# Patient Record
Sex: FEMALE | Race: WHITE | NOT HISPANIC OR LATINO | URBAN - METROPOLITAN AREA
[De-identification: names, ages, dates, MRNs, and addresses within clinical notes are randomized per-mention and may not be internally consistent; named-entity substitution may affect disease eponyms.]

---

## 2020-02-14 ENCOUNTER — INPATIENT (INPATIENT)
Facility: HOSPITAL | Age: 80
LOS: 2 days | Discharge: HOME HEALTH | End: 2020-02-17
Attending: HOSPITALIST | Admitting: HOSPITALIST
Payer: MEDICARE

## 2020-02-14 VITALS
HEART RATE: 110 BPM | DIASTOLIC BLOOD PRESSURE: 85 MMHG | OXYGEN SATURATION: 98 % | TEMPERATURE: 100 F | RESPIRATION RATE: 24 BRPM | SYSTOLIC BLOOD PRESSURE: 160 MMHG

## 2020-02-14 DIAGNOSIS — Z95.1 PRESENCE OF AORTOCORONARY BYPASS GRAFT: Chronic | ICD-10-CM

## 2020-02-14 DIAGNOSIS — L03.90 CELLULITIS, UNSPECIFIED: ICD-10-CM

## 2020-02-14 DIAGNOSIS — Z96.649 PRESENCE OF UNSPECIFIED ARTIFICIAL HIP JOINT: Chronic | ICD-10-CM

## 2020-02-14 LAB
ALBUMIN SERPL ELPH-MCNC: 3.6 G/DL — SIGNIFICANT CHANGE UP (ref 3.3–5)
ALP SERPL-CCNC: 111 U/L — SIGNIFICANT CHANGE UP (ref 40–120)
ALT FLD-CCNC: 12 U/L — SIGNIFICANT CHANGE UP (ref 4–33)
ANION GAP SERPL CALC-SCNC: 12 MMO/L — SIGNIFICANT CHANGE UP (ref 7–14)
APTT BLD: 39.5 SEC — HIGH (ref 27.5–36.3)
AST SERPL-CCNC: 17 U/L — SIGNIFICANT CHANGE UP (ref 4–32)
BASE EXCESS BLDV CALC-SCNC: 1.3 MMOL/L — SIGNIFICANT CHANGE UP
BASOPHILS # BLD AUTO: 0.04 K/UL — SIGNIFICANT CHANGE UP (ref 0–0.2)
BASOPHILS NFR BLD AUTO: 0.3 % — SIGNIFICANT CHANGE UP (ref 0–2)
BILIRUB SERPL-MCNC: 1 MG/DL — SIGNIFICANT CHANGE UP (ref 0.2–1.2)
BLOOD GAS VENOUS - CREATININE: 0.45 MG/DL — LOW (ref 0.5–1.3)
BLOOD GAS VENOUS - FIO2: 21 — SIGNIFICANT CHANGE UP
BUN SERPL-MCNC: 23 MG/DL — SIGNIFICANT CHANGE UP (ref 7–23)
CALCIUM SERPL-MCNC: 10 MG/DL — SIGNIFICANT CHANGE UP (ref 8.4–10.5)
CHLORIDE BLDV-SCNC: 110 MMOL/L — HIGH (ref 96–108)
CHLORIDE SERPL-SCNC: 106 MMOL/L — SIGNIFICANT CHANGE UP (ref 98–107)
CO2 SERPL-SCNC: 25 MMOL/L — SIGNIFICANT CHANGE UP (ref 22–31)
CREAT SERPL-MCNC: 0.41 MG/DL — LOW (ref 0.5–1.3)
EOSINOPHIL # BLD AUTO: 0.03 K/UL — SIGNIFICANT CHANGE UP (ref 0–0.5)
EOSINOPHIL NFR BLD AUTO: 0.2 % — SIGNIFICANT CHANGE UP (ref 0–6)
GAS PNL BLDV: 143 MMOL/L — SIGNIFICANT CHANGE UP (ref 136–146)
GLUCOSE BLDV-MCNC: 129 MG/DL — HIGH (ref 70–99)
GLUCOSE SERPL-MCNC: 129 MG/DL — HIGH (ref 70–99)
HCO3 BLDV-SCNC: 23 MMOL/L — SIGNIFICANT CHANGE UP (ref 20–27)
HCT VFR BLD CALC: 33.6 % — LOW (ref 34.5–45)
HCT VFR BLDV CALC: 49.7 % — HIGH (ref 34.5–45)
HGB BLD-MCNC: 10.5 G/DL — LOW (ref 11.5–15.5)
HGB BLDV-MCNC: 16.2 G/DL — HIGH (ref 11.5–15.5)
IMM GRANULOCYTES NFR BLD AUTO: 1 % — SIGNIFICANT CHANGE UP (ref 0–1.5)
INR BLD: 1.07 — SIGNIFICANT CHANGE UP (ref 0.88–1.17)
LACTATE BLDV-MCNC: 2.4 MMOL/L — HIGH (ref 0.5–2)
LYMPHOCYTES # BLD AUTO: 18.5 % — SIGNIFICANT CHANGE UP (ref 13–44)
LYMPHOCYTES # BLD AUTO: 2.56 K/UL — SIGNIFICANT CHANGE UP (ref 1–3.3)
MAGNESIUM SERPL-MCNC: 2.1 MG/DL — SIGNIFICANT CHANGE UP (ref 1.6–2.6)
MCHC RBC-ENTMCNC: 30.3 PG — SIGNIFICANT CHANGE UP (ref 27–34)
MCHC RBC-ENTMCNC: 31.3 % — LOW (ref 32–36)
MCV RBC AUTO: 97.1 FL — SIGNIFICANT CHANGE UP (ref 80–100)
MONOCYTES # BLD AUTO: 1.5 K/UL — HIGH (ref 0–0.9)
MONOCYTES NFR BLD AUTO: 10.8 % — SIGNIFICANT CHANGE UP (ref 2–14)
NEUTROPHILS # BLD AUTO: 9.56 K/UL — HIGH (ref 1.8–7.4)
NEUTROPHILS NFR BLD AUTO: 69.2 % — SIGNIFICANT CHANGE UP (ref 43–77)
NRBC # FLD: 0 K/UL — SIGNIFICANT CHANGE UP (ref 0–0)
PCO2 BLDV: 48 MMHG — SIGNIFICANT CHANGE UP (ref 41–51)
PH BLDV: 7.36 PH — SIGNIFICANT CHANGE UP (ref 7.32–7.43)
PHOSPHATE SERPL-MCNC: 3 MG/DL — SIGNIFICANT CHANGE UP (ref 2.5–4.5)
PLATELET # BLD AUTO: 507 K/UL — HIGH (ref 150–400)
PMV BLD: 10 FL — SIGNIFICANT CHANGE UP (ref 7–13)
PO2 BLDV: 26 MMHG — LOW (ref 35–40)
POTASSIUM BLDV-SCNC: 4.3 MMOL/L — SIGNIFICANT CHANGE UP (ref 3.4–4.5)
POTASSIUM SERPL-MCNC: 4.5 MMOL/L — SIGNIFICANT CHANGE UP (ref 3.5–5.3)
POTASSIUM SERPL-SCNC: 4.5 MMOL/L — SIGNIFICANT CHANGE UP (ref 3.5–5.3)
PROT SERPL-MCNC: 7.5 G/DL — SIGNIFICANT CHANGE UP (ref 6–8.3)
PROTHROM AB SERPL-ACNC: 12.2 SEC — SIGNIFICANT CHANGE UP (ref 9.8–13.1)
RBC # BLD: 3.46 M/UL — LOW (ref 3.8–5.2)
RBC # FLD: 14.8 % — HIGH (ref 10.3–14.5)
SAO2 % BLDV: 28.6 % — LOW (ref 60–85)
SODIUM SERPL-SCNC: 143 MMOL/L — SIGNIFICANT CHANGE UP (ref 135–145)
WBC # BLD: 13.83 K/UL — HIGH (ref 3.8–10.5)
WBC # FLD AUTO: 13.83 K/UL — HIGH (ref 3.8–10.5)

## 2020-02-14 PROCEDURE — 93971 EXTREMITY STUDY: CPT | Mod: 26,LT

## 2020-02-14 PROCEDURE — 71045 X-RAY EXAM CHEST 1 VIEW: CPT | Mod: 26

## 2020-02-14 RX ORDER — AMPICILLIN SODIUM AND SULBACTAM SODIUM 250; 125 MG/ML; MG/ML
1.5 INJECTION, POWDER, FOR SUSPENSION INTRAMUSCULAR; INTRAVENOUS ONCE
Refills: 0 | Status: COMPLETED | OUTPATIENT
Start: 2020-02-14 | End: 2020-02-14

## 2020-02-14 RX ORDER — SODIUM CHLORIDE 9 MG/ML
500 INJECTION INTRAMUSCULAR; INTRAVENOUS; SUBCUTANEOUS ONCE
Refills: 0 | Status: COMPLETED | OUTPATIENT
Start: 2020-02-14 | End: 2020-02-14

## 2020-02-14 RX ADMIN — SODIUM CHLORIDE 500 MILLILITER(S): 9 INJECTION INTRAMUSCULAR; INTRAVENOUS; SUBCUTANEOUS at 22:03

## 2020-02-14 RX ADMIN — AMPICILLIN SODIUM AND SULBACTAM SODIUM 200 GRAM(S): 250; 125 INJECTION, POWDER, FOR SUSPENSION INTRAMUSCULAR; INTRAVENOUS at 22:44

## 2020-02-14 NOTE — ED PROVIDER NOTE - OBJECTIVE STATEMENT
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 Was seen by [facility] started on ciprofloxacin for suspected cellulitis, however has conitnued to have worsening of swelling subsequently brought here for evaluation. Is not on AC despite hx of multiple clots due to GI bleeds that occurred with therapy. Son is worried she has a blood clot.

## 2020-02-14 NOTE — ED PROVIDER NOTE - CLINICAL SUMMARY MEDICAL DECISION MAKING FREE TEXT BOX
80F with complex hx (see HPI) presenting with new RLE swelling and redness that is already on cipro and getting worse, high suspicion for blood clot but patient with GI bleeds when on AC in the past, will check labs, doppler, cxr due to hx of cough x3 days per aide, if clot start on heparin as it is easy to turn off and reverse if a significant GI bleed develops, patient TBA given high likelihood of failure with outpatient management.

## 2020-02-14 NOTE — ED PROVIDER NOTE - PHYSICAL EXAMINATION
Gen: chronically ill appearing, all extremities contracted, non-verbal  Eyes: PERRL, EOMI   HENT: Normocephalic, atraumatic. External ears normal, no rhinorrhea, dry mucous membranes.   CV: tachycardia, regular rhythm  Resp: CTAB though difficult to assess given kyphosis  Abd: soft, non tender, non rigid, no guarding or rebound tenderness  Back: No CVAT bilaterally, no midline ttp  Skin: dry, wwp   Neuro: non-verbal, all extremities with contractures, RUE tremor  Psych: baseline per family

## 2020-02-14 NOTE — ED ADULT TRIAGE NOTE - CHIEF COMPLAINT QUOTE
Arrives via EMS pt non-verbal.  As per son right leg swelling, saw PMD via home visit, for same and started on Cipro.  Son noticed rash today.  Pt lives with son, appears contracted.

## 2020-02-14 NOTE — ED PROVIDER NOTE - PMH
Cancer of breast, female, left    DVT (deep venous thrombosis)    GIB (gastrointestinal bleeding)    Glaucoma    S/P IVC filter    TIA (transient ischemic attack)

## 2020-02-14 NOTE — ED ADULT NURSE NOTE - OBJECTIVE STATEMENT
Pt presents to ED with right lower extremity redness/swelling that began on Wednesday. Pt nonverbal as per family members. Pt contracted and left arm does not work. As per son, pt had a doctor home visit where he started her on cipro but the swelling and redness became worse. redness and swelling noted to right lower extremity and warm to touch. Nonverbal indicators of pain absent at this time. pt NSR/sinus tachycardia on the monitor. STage 3 pressure ulcer noted to the coccyx/sacral area. Unable to obtain IV access at this time. MD Watson made aware. Will continue to monitor.

## 2020-02-14 NOTE — ED PROVIDER NOTE - ATTENDING CONTRIBUTION TO CARE
Héctor: 79yo female with an extensive medical history including anoxic brain injury and subsequently bed bound and nonambulatory with h/o multiple DVT in the past BIBEMS for RLE swelling and erythema x 3 days. Pt starte don outpatient cipro but her swelling and redness has continued to worsen prompting her visit to the ED today. Pt unable to provide any history an dis non verbal as per family. No apparent SOB or tachypnea. Exam: GENERAL: chronically ill appearing, NAD, HEENT: MMM, CARDIO: +S1/S2, no murmurs, rubs or gallops, LUNGS: CTA B/L, no wheezing, rales or rhonchi, ABD: soft, nontender, BSx4 quadrants, no guarding or rigidity. EXT: contracted b/l LE and LUE. + RLE erythema and edema with palpable cords in calf and TTP. NEURO: awake and alert but nonverbal and doesn't follow commands. SKIN: no rashes or lesions, well perfused A/P- 81 yo female with likley RLE DVT. Pt has IVC filter and a poor candidate for anticoagulation given previous histories of GI bleeds. will obtain US.

## 2020-02-14 NOTE — ED PROVIDER NOTE - CARE PLAN
Principal Discharge DX:	Cellulitis of right lower extremity  Secondary Diagnosis:	Failure of outpatient treatment  Secondary Diagnosis:	Hematoma of lower extremity, initial encounter

## 2020-02-14 NOTE — ED ADULT NURSE NOTE - INTERVENTIONS DEFINITIONS
Call bell, personal items and telephone within reach/Monitor for mental status changes and reorient to person, place, and time/Room bathroom lighting operational/Stretcher in lowest position, wheels locked, appropriate side rails in place/Monitor gait and stability/Reinforce activity limits and safety measures with patient and family/[place] to call system/Instruct patient to call for assistance/Non-slip footwear when patient is off stretcher/Physically safe environment: no spills, clutter or unnecessary equipment/Review medications for side effects contributing to fall risk

## 2020-02-15 DIAGNOSIS — M71.20 SYNOVIAL CYST OF POPLITEAL SPACE [BAKER], UNSPECIFIED KNEE: ICD-10-CM

## 2020-02-15 DIAGNOSIS — S80.10XA CONTUSION OF UNSPECIFIED LOWER LEG, INITIAL ENCOUNTER: ICD-10-CM

## 2020-02-15 DIAGNOSIS — I25.10 ATHEROSCLEROTIC HEART DISEASE OF NATIVE CORONARY ARTERY WITHOUT ANGINA PECTORIS: ICD-10-CM

## 2020-02-15 DIAGNOSIS — Z29.9 ENCOUNTER FOR PROPHYLACTIC MEASURES, UNSPECIFIED: ICD-10-CM

## 2020-02-15 DIAGNOSIS — Z02.9 ENCOUNTER FOR ADMINISTRATIVE EXAMINATIONS, UNSPECIFIED: ICD-10-CM

## 2020-02-15 DIAGNOSIS — L03.115 CELLULITIS OF RIGHT LOWER LIMB: ICD-10-CM

## 2020-02-15 DIAGNOSIS — Z98.890 OTHER SPECIFIED POSTPROCEDURAL STATES: Chronic | ICD-10-CM

## 2020-02-15 DIAGNOSIS — G93.1 ANOXIC BRAIN DAMAGE, NOT ELSEWHERE CLASSIFIED: ICD-10-CM

## 2020-02-15 DIAGNOSIS — I82.409 ACUTE EMBOLISM AND THROMBOSIS OF UNSPECIFIED DEEP VEINS OF UNSPECIFIED LOWER EXTREMITY: ICD-10-CM

## 2020-02-15 LAB
ALBUMIN SERPL ELPH-MCNC: 3.1 G/DL — LOW (ref 3.3–5)
ALP SERPL-CCNC: 105 U/L — SIGNIFICANT CHANGE UP (ref 40–120)
ALT FLD-CCNC: 8 U/L — SIGNIFICANT CHANGE UP (ref 4–33)
ANION GAP SERPL CALC-SCNC: 13 MMO/L — SIGNIFICANT CHANGE UP (ref 7–14)
APTT BLD: 27.3 SEC — LOW (ref 27.5–36.3)
AST SERPL-CCNC: 13 U/L — SIGNIFICANT CHANGE UP (ref 4–32)
BASOPHILS # BLD AUTO: 0.02 K/UL — SIGNIFICANT CHANGE UP (ref 0–0.2)
BASOPHILS NFR BLD AUTO: 0.2 % — SIGNIFICANT CHANGE UP (ref 0–2)
BILIRUB SERPL-MCNC: 0.9 MG/DL — SIGNIFICANT CHANGE UP (ref 0.2–1.2)
BUN SERPL-MCNC: 20 MG/DL — SIGNIFICANT CHANGE UP (ref 7–23)
CALCIUM SERPL-MCNC: 9.3 MG/DL — SIGNIFICANT CHANGE UP (ref 8.4–10.5)
CHLORIDE SERPL-SCNC: 110 MMOL/L — HIGH (ref 98–107)
CO2 SERPL-SCNC: 23 MMOL/L — SIGNIFICANT CHANGE UP (ref 22–31)
CREAT SERPL-MCNC: 0.37 MG/DL — LOW (ref 0.5–1.3)
EOSINOPHIL # BLD AUTO: 0.05 K/UL — SIGNIFICANT CHANGE UP (ref 0–0.5)
EOSINOPHIL NFR BLD AUTO: 0.5 % — SIGNIFICANT CHANGE UP (ref 0–6)
GLUCOSE SERPL-MCNC: 122 MG/DL — HIGH (ref 70–99)
HCT VFR BLD CALC: 28.6 % — LOW (ref 34.5–45)
HGB BLD-MCNC: 9.1 G/DL — LOW (ref 11.5–15.5)
IMM GRANULOCYTES NFR BLD AUTO: 0.5 % — SIGNIFICANT CHANGE UP (ref 0–1.5)
LYMPHOCYTES # BLD AUTO: 2.43 K/UL — SIGNIFICANT CHANGE UP (ref 1–3.3)
LYMPHOCYTES # BLD AUTO: 22.4 % — SIGNIFICANT CHANGE UP (ref 13–44)
MAGNESIUM SERPL-MCNC: 2.1 MG/DL — SIGNIFICANT CHANGE UP (ref 1.6–2.6)
MCHC RBC-ENTMCNC: 30.8 PG — SIGNIFICANT CHANGE UP (ref 27–34)
MCHC RBC-ENTMCNC: 31.8 % — LOW (ref 32–36)
MCV RBC AUTO: 96.9 FL — SIGNIFICANT CHANGE UP (ref 80–100)
MONOCYTES # BLD AUTO: 1.02 K/UL — HIGH (ref 0–0.9)
MONOCYTES NFR BLD AUTO: 9.4 % — SIGNIFICANT CHANGE UP (ref 2–14)
NEUTROPHILS # BLD AUTO: 7.3 K/UL — SIGNIFICANT CHANGE UP (ref 1.8–7.4)
NEUTROPHILS NFR BLD AUTO: 67 % — SIGNIFICANT CHANGE UP (ref 43–77)
NRBC # FLD: 0 K/UL — SIGNIFICANT CHANGE UP (ref 0–0)
PHOSPHATE SERPL-MCNC: 2.8 MG/DL — SIGNIFICANT CHANGE UP (ref 2.5–4.5)
PLATELET # BLD AUTO: 437 K/UL — HIGH (ref 150–400)
PMV BLD: 9.5 FL — SIGNIFICANT CHANGE UP (ref 7–13)
POTASSIUM SERPL-MCNC: 4 MMOL/L — SIGNIFICANT CHANGE UP (ref 3.5–5.3)
POTASSIUM SERPL-SCNC: 4 MMOL/L — SIGNIFICANT CHANGE UP (ref 3.5–5.3)
PROT SERPL-MCNC: 6.8 G/DL — SIGNIFICANT CHANGE UP (ref 6–8.3)
RBC # BLD: 2.95 M/UL — LOW (ref 3.8–5.2)
RBC # FLD: 15 % — HIGH (ref 10.3–14.5)
SODIUM SERPL-SCNC: 146 MMOL/L — HIGH (ref 135–145)
WBC # BLD: 10.87 K/UL — HIGH (ref 3.8–10.5)
WBC # FLD AUTO: 10.87 K/UL — HIGH (ref 3.8–10.5)

## 2020-02-15 PROCEDURE — 73600 X-RAY EXAM OF ANKLE: CPT | Mod: 26,RT

## 2020-02-15 PROCEDURE — 99223 1ST HOSP IP/OBS HIGH 75: CPT | Mod: GC

## 2020-02-15 PROCEDURE — 73590 X-RAY EXAM OF LOWER LEG: CPT | Mod: 26,RT

## 2020-02-15 PROCEDURE — 12345: CPT | Mod: NC,GC

## 2020-02-15 RX ORDER — SUCRALFATE 1 G
5 TABLET ORAL
Qty: 0 | Refills: 0 | DISCHARGE

## 2020-02-15 RX ORDER — METOPROLOL TARTRATE 50 MG
50 TABLET ORAL
Refills: 0 | Status: DISCONTINUED | OUTPATIENT
Start: 2020-02-15 | End: 2020-02-17

## 2020-02-15 RX ORDER — CEFAZOLIN SODIUM 1 G
1000 VIAL (EA) INJECTION EVERY 12 HOURS
Refills: 0 | Status: DISCONTINUED | OUTPATIENT
Start: 2020-02-15 | End: 2020-02-15

## 2020-02-15 RX ORDER — PANTOPRAZOLE SODIUM 20 MG/1
40 TABLET, DELAYED RELEASE ORAL
Refills: 0 | Status: DISCONTINUED | OUTPATIENT
Start: 2020-02-15 | End: 2020-02-15

## 2020-02-15 RX ORDER — PANTOPRAZOLE SODIUM 20 MG/1
1 TABLET, DELAYED RELEASE ORAL
Qty: 0 | Refills: 0 | DISCHARGE

## 2020-02-15 RX ORDER — CEFAZOLIN SODIUM 1 G
1000 VIAL (EA) INJECTION EVERY 8 HOURS
Refills: 0 | Status: DISCONTINUED | OUTPATIENT
Start: 2020-02-15 | End: 2020-02-15

## 2020-02-15 RX ORDER — ATORVASTATIN CALCIUM 80 MG/1
80 TABLET, FILM COATED ORAL AT BEDTIME
Refills: 0 | Status: DISCONTINUED | OUTPATIENT
Start: 2020-02-15 | End: 2020-02-17

## 2020-02-15 RX ORDER — EZETIMIBE 10 MG/1
1 TABLET ORAL
Qty: 0 | Refills: 0 | DISCHARGE

## 2020-02-15 RX ORDER — SUCRALFATE 1 G
0.5 TABLET ORAL
Refills: 0 | Status: DISCONTINUED | OUTPATIENT
Start: 2020-02-15 | End: 2020-02-17

## 2020-02-15 RX ORDER — FERROUS SULFATE 325(65) MG
325 TABLET ORAL DAILY
Refills: 0 | Status: DISCONTINUED | OUTPATIENT
Start: 2020-02-15 | End: 2020-02-17

## 2020-02-15 RX ORDER — PANTOPRAZOLE SODIUM 20 MG/1
40 TABLET, DELAYED RELEASE ORAL DAILY
Refills: 0 | Status: DISCONTINUED | OUTPATIENT
Start: 2020-02-15 | End: 2020-02-17

## 2020-02-15 RX ORDER — LEVETIRACETAM 250 MG/1
500 TABLET, FILM COATED ORAL
Refills: 0 | Status: DISCONTINUED | OUTPATIENT
Start: 2020-02-15 | End: 2020-02-17

## 2020-02-15 RX ORDER — SODIUM CHLORIDE 9 MG/ML
1000 INJECTION, SOLUTION INTRAVENOUS
Refills: 0 | Status: DISCONTINUED | OUTPATIENT
Start: 2020-02-15 | End: 2020-02-17

## 2020-02-15 RX ORDER — LEVETIRACETAM 250 MG/1
1 TABLET, FILM COATED ORAL
Qty: 0 | Refills: 0 | DISCHARGE

## 2020-02-15 RX ORDER — CEFAZOLIN SODIUM 1 G
1000 VIAL (EA) INJECTION EVERY 8 HOURS
Refills: 0 | Status: DISCONTINUED | OUTPATIENT
Start: 2020-02-15 | End: 2020-02-17

## 2020-02-15 RX ADMIN — SODIUM CHLORIDE 75 MILLILITER(S): 9 INJECTION, SOLUTION INTRAVENOUS at 09:09

## 2020-02-15 RX ADMIN — Medication 325 MILLIGRAM(S): at 12:20

## 2020-02-15 RX ADMIN — Medication 100 MILLIGRAM(S): at 22:08

## 2020-02-15 RX ADMIN — Medication 0.5 GRAM(S): at 05:49

## 2020-02-15 RX ADMIN — Medication 50 MILLIGRAM(S): at 05:49

## 2020-02-15 RX ADMIN — Medication 50 MILLIGRAM(S): at 17:26

## 2020-02-15 RX ADMIN — Medication 100 MILLIGRAM(S): at 05:49

## 2020-02-15 RX ADMIN — Medication 100 MILLIGRAM(S): at 13:08

## 2020-02-15 RX ADMIN — LEVETIRACETAM 500 MILLIGRAM(S): 250 TABLET, FILM COATED ORAL at 17:26

## 2020-02-15 RX ADMIN — ATORVASTATIN CALCIUM 80 MILLIGRAM(S): 80 TABLET, FILM COATED ORAL at 22:08

## 2020-02-15 RX ADMIN — Medication 0.5 GRAM(S): at 17:26

## 2020-02-15 RX ADMIN — PANTOPRAZOLE SODIUM 40 MILLIGRAM(S): 20 TABLET, DELAYED RELEASE ORAL at 12:19

## 2020-02-15 RX ADMIN — Medication 10 MILLIGRAM(S): at 12:20

## 2020-02-15 RX ADMIN — LEVETIRACETAM 500 MILLIGRAM(S): 250 TABLET, FILM COATED ORAL at 05:49

## 2020-02-15 NOTE — PROGRESS NOTE ADULT - PROBLEM SELECTOR PLAN 7
DVT: SCD due to bleeding risks  Diet: pureed diet  GI ppx: carafate and protonix  GOC: DNR/DNI/Limited medical interventions/no feeding tube

## 2020-02-15 NOTE — H&P ADULT - PROBLEM SELECTOR PLAN 2
5.4 x 2.5 x 3.1 cm sized heterogeneous echogenic lesion in the proximal calf, likely hematoma.  -continue to monitor  -monitor HgB

## 2020-02-15 NOTE — PROGRESS NOTE ADULT - PROBLEM SELECTOR PLAN 4
1.5 cm Baker's cyst in the popliteal fossa.  -monitor for increased knee swelling or signs of pain   -pain meds as indicated

## 2020-02-15 NOTE — PROGRESS NOTE ADULT - PROBLEM SELECTOR PLAN 1
Pt with RLE swelling, redness and warmth. Febrile to 100.9   -Day 1/5 cefazolin 1 g q8h IV. May switch to orals after further clinical improvement.  -duplex without evidence of DVT   -low suspicion for septic joint however will get xray of the right foot and ankle to further assess for joint effusion   -f/u bldcx  -s/p unasyn in ED; start cefazolin Pt with RLE swelling, redness and warmth. Febrile to 100.9   -Day 1/5 cefazolin 1 g q8h IV. May switch to orals after further clinical improvement.  -duplex without evidence of DVT   -low suspicion for septic joint however will get xray of the right foot and ankle to further assess for joint effusion   -f/u bldcx

## 2020-02-15 NOTE — PROGRESS NOTE ADULT - SUBJECTIVE AND OBJECTIVE BOX
INCOMPLETE NOTE PROGRESS NOTE:     Patient is a 80y old  Female who presents with a chief complaint of Cellulitis (15 Feb 2020 09:53)      SUBJECTIVE / OVERNIGHT EVENTS:  No acute events overnight  History could not be obtained due to patient's mental status  Per family, hematoma, leg swelling appears unchanged in size. They first noticed it yesterday.    ADDITIONAL REVIEW OF SYSTEMS:  History could not be obtained due to patient's mental status    MEDICATIONS  (STANDING):  atorvastatin 80 milliGRAM(s) Oral at bedtime  ceFAZolin   IVPB 1000 milliGRAM(s) IV Intermittent every 8 hours  ferrous    sulfate 325 milliGRAM(s) Oral daily  levETIRAcetam 500 milliGRAM(s) Oral two times a day  metoprolol tartrate 50 milliGRAM(s) Oral two times a day  pantoprazole   Suspension 40 milliGRAM(s) Oral daily  PARoxetine 10 milliGRAM(s) Oral daily  sodium chloride 0.45%. 1000 milliLiter(s) (75 mL/Hr) IV Continuous <Continuous>  sucralfate suspension 0.5 Gram(s) Oral two times a day    MEDICATIONS  (PRN):      CAPILLARY BLOOD GLUCOSE        I&O's Summary    14 Feb 2020 07:01  -  15 Feb 2020 07:00  --------------------------------------------------------  IN: 50 mL / OUT: 0 mL / NET: 50 mL        PHYSICAL EXAM:  Vital Signs Last 24 Hrs  T(C): 36.9 (15 Feb 2020 05:46), Max: 38.3 (14 Feb 2020 23:49)  T(F): 98.4 (15 Feb 2020 05:46), Max: 100.9 (14 Feb 2020 23:49)  HR: 98 (15 Feb 2020 05:46) (98 - 114)  BP: 157/73 (15 Feb 2020 05:46) (153/61 - 161/35)  BP(mean): --  RR: 18 (15 Feb 2020 05:46) (18 - 24)  SpO2: 99% (15 Feb 2020 05:46) (90% - 100%)    CONSTITUTIONAL: Lying in bed. Contracted. Does not make eye contact. Grunting.  CARDIOVASCULAR: Regular rate and rhythm. Normal S1 and S2. No murmurs.  RESPIRATORY: Normal respiratory effort, Lungs CTAB  EXTREMITIES: Hypertonic extremities. There is an area of mild erythema starting 1 cm above the R malleoli and extending 5-6 cm from there primarily on the anterior medial aspect that has now been demarcated with a marker. There is a firm, mass in flexor compartment of the right lower extremity below the knee with overlying erythema.  ABDOMEN: Nontender to palpation, no rebound/guarding; No hepatosplenomegaly  PSYCH/NEURO: Nonverbal, grunting. Does not track. Hypertonic extremities    LABS:                        9.1    10.87 )-----------( 437      ( 15 Feb 2020 05:02 )             28.6     02-15    146<H>  |  110<H>  |  20  ----------------------------<  122<H>  4.0   |  23  |  0.37<L>    Ca    9.3      15 Feb 2020 05:02  Phos  2.8     02-15  Mg     2.1     02-15    TPro  6.8  /  Alb  3.1<L>  /  TBili  0.9  /  DBili  x   /  AST  13  /  ALT  8   /  AlkPhos  105  02-15    PT/INR - ( 14 Feb 2020 20:40 )   PT: 12.2 SEC;   INR: 1.07          PTT - ( 15 Feb 2020 05:02 )  PTT:27.3 SEC            RADIOLOGY & ADDITIONAL TESTS:  Results Reviewed: Leukocytosis downtrending, mild hypernatremia.  Imaging Personally Reviewed:  Electrocardiogram Personally Reviewed:    COORDINATION OF CARE:  Care Discussed with Consultants/Other Providers [Y/N]:  Prior or Outpatient Records Reviewed [Y/N]:

## 2020-02-15 NOTE — H&P ADULT - PROBLEM SELECTOR PLAN 3
-pt with hx of anoxic brain injury in 1997 with intermittent agitation  -c/w paxil -pt with hx of anoxic brain injury in 1997 with intermittent agitation  -c/w paxil  -c/w keppra for seizure ppx

## 2020-02-15 NOTE — H&P ADULT - ASSESSMENT
75 year old female with CAD with MI s/p CABG x5 in 1997 with bypass rupture a few hours after surgery resulting in severe anoxic brain injury with resulting functional quadriplegia, also with, vascular dementia, L breast cancer (dx 2002 s/p quadrantectomy), bilateral DVTs in 2004 (transiently on coumadin, but had to be discontinued to episodes of UGIB, now with IVC filter), GERD, PUB s/p multiple pRBC transfusions, rectal prolapse (with intermittent blood in stools), TIA in 2013, R ulna fracture in 2009, R femur fracture in 2012, R tibia/fibula fracture in 2014. Px with RLE swelling c/w cellulitis. No DVT on duplex. 75 year old female with CAD with MI s/p CABG x5 in 1997 with bypass rupture a few hours after surgery resulting in severe anoxic brain injury with resulting functional quadriplegia, also with, vascular dementia, L breast cancer (dx 2002 s/p quadrantectomy), bilateral DVTs in 2004 (transiently on coumadin, but had to be discontinued to episodes of UGIB, now with IVC filter), GERD, PUD s/p multiple pRBC transfusions, rectal prolapse (with intermittent blood in stools), TIA in 2013, R ulna fracture in 2009, R femur fracture in 2012, R tibia/fibula fracture in 2014. Px with RLE swelling c/w cellulitis. No DVT on duplex.       No evidence of right lower extremity deep venous thrombosis.    A 1.5 cm Baker's cyst in the popliteal fossa. 75 year old female with CAD with MI s/p CABG x5 in 1997 with bypass rupture a few hours after surgery resulting in severe anoxic brain injury with resulting functional quadriplegia, also with, vascular dementia, L breast cancer (dx 2002 s/p quadrantectomy), bilateral DVTs in 2004 (transiently on coumadin, but had to be discontinued to episodes of UGIB, now with IVC filter), GERD, PUD s/p multiple pRBC transfusions, rectal prolapse (with intermittent blood in stools), TIA in 2013, R ulna fracture in 2009, R femur fracture in 2012, R tibia/fibula fracture in 2014. Px with RLE swelling c/w cellulitis. No DVT on duplex.

## 2020-02-15 NOTE — H&P ADULT - NSICDXPASTMEDICALHX_GEN_ALL_CORE_FT
PAST MEDICAL HISTORY:  Cancer of breast, female, left     DVT (deep venous thrombosis)     GIB (gastrointestinal bleeding)     Glaucoma     S/P IVC filter     TIA (transient ischemic attack)

## 2020-02-15 NOTE — H&P ADULT - NSHPPHYSICALEXAM_GEN_ALL_CORE
Vital Signs Last 24 Hrs  T(C): 38.3 (14 Feb 2020 23:49), Max: 38.3 (14 Feb 2020 23:49)  T(F): 100.9 (14 Feb 2020 23:49), Max: 100.9 (14 Feb 2020 23:49)  HR: 114 (14 Feb 2020 23:49) (110 - 114)  BP: 161/35 (14 Feb 2020 23:49) (160/85 - 161/35)  BP(mean): --  RR: 18 (14 Feb 2020 23:49) (18 - 24)  SpO2: 100% (14 Feb 2020 23:49) (98% - 100%)    GENERAL: NAD, chronically ill appearing, all extremities contracted  HEAD: Atraumatic, Normocephalic  EYES: EOMI, PERRLA, conjunctiva and sclera clear  NECK: Supple, No JVD  CHEST/LUNG: Clear to auscultation bilaterally; No wheezes/rales/rhonchi  HEART: Regular rate and rhythm; No murmurs, rubs, or gallops  ABDOMEN: Soft, Nontender, Nondistended; Bowel sounds present  EXTREMITIES:  2+ dP pulses b/l,  + RLE erythema and edema   PSYCH: reactive affect  NEUROLOGY: non-verbal at time of exam, spontaneosly moves all extremities   SKIN: No rashes or lesions Vital Signs Last 24 Hrs  T(C): 38.3 (14 Feb 2020 23:49), Max: 38.3 (14 Feb 2020 23:49)  T(F): 100.9 (14 Feb 2020 23:49), Max: 100.9 (14 Feb 2020 23:49)  HR: 114 (14 Feb 2020 23:49) (110 - 114)  BP: 161/35 (14 Feb 2020 23:49) (160/85 - 161/35)  BP(mean): --  RR: 18 (14 Feb 2020 23:49) (18 - 24)  SpO2: 100% (14 Feb 2020 23:49) (98% - 100%)    GENERAL: NAD, chronically ill appearing, all extremities contracted  HEAD: Atraumatic, Normocephalic  EYES: EOMI, PERRLA, conjunctiva and sclera clear  NECK: Supple, No JVD  CHEST/LUNG: Clear to auscultation bilaterally; No wheezes/rales/rhonchi  HEART: Regular rate and rhythm; No murmurs, rubs, or gallops  ABDOMEN: Soft, Nontender, Nondistended; Bowel sounds present  EXTREMITIES:  2+ dP pulses b/l,  + RLE erythema and edema , constantly flopping RUE (normal for patient as per son)   PSYCH: reactive affect  NEUROLOGY: non-verbal at time of exam, spontaneously moves all extremities   SKIN: No rashes or lesions Vital Signs Last 24 Hrs  T(C): 38.3 (14 Feb 2020 23:49), Max: 38.3 (14 Feb 2020 23:49)  T(F): 100.9 (14 Feb 2020 23:49), Max: 100.9 (14 Feb 2020 23:49)  HR: 114 (14 Feb 2020 23:49) (110 - 114)  BP: 161/35 (14 Feb 2020 23:49) (160/85 - 161/35)  BP(mean): --  RR: 18 (14 Feb 2020 23:49) (18 - 24)  SpO2: 100% (14 Feb 2020 23:49) (98% - 100%)    GENERAL: NAD, chronically ill appearing, all extremities contracted  HEAD: Atraumatic, Normocephalic  EYES: EOMI, PERRLA, conjunctiva and sclera clear  NECK: Supple, No JVD  CHEST/LUNG: Clear to auscultation bilaterally; No wheezes/rales/rhonchi  HEART: Regular rate and rhythm; No murmurs, rubs, or gallops  ABDOMEN: Soft, Nontender, Nondistended; Bowel sounds present  EXTREMITIES:  2+ dP pulses b/l,  + RLE  circumferential erythema and swelling (from above ankle to below knee) , constantly flopping RUE (normal for patient as per son)   PSYCH: reactive affect  NEUROLOGY: non-verbal at time of exam, spontaneously moves all extremities   SKIN: No rashes or lesions

## 2020-02-15 NOTE — PROGRESS NOTE ADULT - PROBLEM SELECTOR PLAN 3
-pt with hx of anoxic brain injury in 1997 with intermittent agitation  -c/w paxil  -c/w keppra for seizure ppx

## 2020-02-15 NOTE — H&P ADULT - ATTENDING COMMENTS
76 y/o F PMH CAD with MI s/p CABG x5 in 1997 with bypass rupture a few hours after surgery resulting in severe anoxic brain injury with resulting functional quadriplegia, dementia p/w right lower leg cellulitis. Continue with ancef. Pending xrays of the leg to r/o trauma, ankle effusion. Hypernatremia noted, start on gentle IVF hydration. Pt DNR/DNI, molst filled. Rest of care as above.

## 2020-02-15 NOTE — H&P ADULT - PROBLEM SELECTOR PLAN 7
DVT: SCD due to bleeding risks  Diet: pureed diet  GI ppx: carafate and protonix DVT: SCD due to bleeding risks  Diet: pureed diet  GI ppx: carafate and protonix  GOC: DNR/DNI/Limited medical interventions/no feeding tube

## 2020-02-15 NOTE — ED ADULT NURSE REASSESSMENT NOTE - NS ED NURSE REASSESS COMMENT FT1
MD Watson made aware of pt's BP and temp 100.9.  No new orders at this time, pt has been given fluids and is on abx for cellulitis.

## 2020-02-15 NOTE — H&P ADULT - NSICDXPASTSURGICALHX_GEN_ALL_CORE_FT
PAST SURGICAL HISTORY:  H/O breast surgery quandrantectomy    History of hip replacement     S/P CABG x 5

## 2020-02-15 NOTE — PROGRESS NOTE ADULT - ATTENDING COMMENTS
Patient was seen and examined personally by me. I have discussed the plan and reviewed the resident's note and agree with the above physical exam findings including assessment and plan except as indicated below.    76 y/o F PMH CAD with MI s/p CABG x5 in 1997 with bypass rupture a few hours after surgery resulting in severe anoxic brain injury with resulting functional quadriplegia, dementia p/w right lower leg cellulitis.     Patient is minimally verbal on exam but is AAOX1 (to self). Says yes to pain in leg, no to SOB.  Per son, Cassie, patient has been very minimally verbal for about 15 yrs    Continue with ancef. f/u Bcx  Xrays of the leg without fractures/dislocations. RLE US neg for DVT but concern for hematoma. Monitor RLE for compartment syndrome  Trend Na, c/w IVF.   DNR/DNI, family interested in hospice

## 2020-02-15 NOTE — PROGRESS NOTE ADULT - ASSESSMENT
75 year old female with CAD with MI s/p CABG x5 in 1997 with bypass rupture a few hours after surgery resulting in severe anoxic brain injury with resulting functional quadriplegia, also with, vascular dementia, L breast cancer (dx 2002 s/p quadrantectomy), bilateral DVTs in 2004 (transiently on coumadin, but had to be discontinued to episodes of UGIB, now with IVC filter), GERD, PUD s/p multiple pRBC transfusions, rectal prolapse (with intermittent blood in stools), TIA in 2013, R ulna fracture in 2009, R femur fracture in 2012, R tibia/fibula fracture in 2014. Px with RLE swelling c/w cellulitis. No DVT on duplex.

## 2020-02-15 NOTE — H&P ADULT - PROBLEM SELECTOR PLAN 6
Hx of DVT in the pass; no DVT demonstrated on duplex today   -s/p IVC filter  -cannot anticoagulate due to bleeding risk

## 2020-02-15 NOTE — PROGRESS NOTE ADULT - PROBLEM SELECTOR PLAN 2
5.4 x 2.5 x 3.1 cm sized heterogeneous echogenic lesion in the proximal calf, likely hematoma.  -continue to monitor  -monitor HgB 5.4 x 2.5 x 3.1 cm sized heterogeneous echogenic lesion in the proximal calf, likely hematoma.  -Drop in hgb is very likely dilutional given that all lines dropped.  -continue to monitor  -monitor HgB

## 2020-02-15 NOTE — H&P ADULT - PROBLEM SELECTOR PLAN 1
Pt with RLE swelling, redness and warmth. Febrile to 100.9   -duplex without evidence of DVT   -f/u bldcx  -s/p unasyn in ED; start cefazolin Pt with RLE swelling, redness and warmth. Febrile to 100.9   -duplex without evidence of DVT   -low suspicion for septic joint however will get xray of the right foot and ankle to further assess for joint effusion   -f/u bldcx  -s/p unasyn in ED; start cefazolin

## 2020-02-15 NOTE — H&P ADULT - HISTORY OF PRESENT ILLNESS
75 year old female with CAD with MI s/p CABG x5 in 1997 with bypass rupture a few hours after surgery resulting in severe anoxic brain injury with resulting functional quadriplegia, also with, vascular dementia, L breast cancer (dx 2002 s/p quadrantectomy), bilateral DVTs in 2004 (transiently on coumadin, but had to be discontinued to episodes of UGIB, now with IVC filter), GERD, PUB s/p multiple pRBC transfusions, rectal prolapse (with intermittent blood in stools), TIA in 2013, R ulna fracture in 2009, R femur fracture in 2012, R tibia/fibula fracture in 2014. Patient presenting for evaluation of right lower extremity swelling. Patient's son (present at bedside), stated that he first noticed  redness and swelling on 2/11/20 which has continued to enlarge since onset. He also reported increased R leg warmth. Patient was evaluated home visit doctor and was started on ciprofloxacin for suspected cellulitis. Swelling continued to worsen despite oral abx. At baseline patient selective answers questions; usually communicates using 'yes' or 'no,' and is easily agitated, not oriented. 75 year old female with CAD with MI s/p CABG x5 in 1997 with bypass rupture a few hours after surgery resulting in severe anoxic brain injury with resulting functional quadriplegia, also with, vascular dementia, L breast cancer (dx 2002 s/p quadrantectomy), bilateral DVTs in 2004 (transiently on coumadin, but had to be discontinued to episodes of UGIB, now with IVC filter), GERD, PUD s/p multiple pRBC transfusions, rectal prolapse (with intermittent blood in stools), TIA in 2013, R ulna fracture in 2009, R femur fracture in 2012, R tibia/fibula fracture in 2014. Patient presenting for evaluation of right lower extremity swelling. Patient's son (present at bedside), stated that he first noticed  redness and swelling on 2/11/20 which has continued to enlarge since onset. He also reported increased R leg warmth. Patient was evaluated home visit doctor and was started on ciprofloxacin for suspected cellulitis. Swelling continued to worsen despite oral abx. At baseline patient selective answers questions; usually communicates using 'yes' or 'no,' and is easily agitated, not oriented.

## 2020-02-16 ENCOUNTER — TRANSCRIPTION ENCOUNTER (OUTPATIENT)
Age: 80
End: 2020-02-16

## 2020-02-16 LAB
ALBUMIN SERPL ELPH-MCNC: 2.8 G/DL — LOW (ref 3.3–5)
ALP SERPL-CCNC: 103 U/L — SIGNIFICANT CHANGE UP (ref 40–120)
ALT FLD-CCNC: 5 U/L — SIGNIFICANT CHANGE UP (ref 4–33)
ANION GAP SERPL CALC-SCNC: 14 MMO/L — SIGNIFICANT CHANGE UP (ref 7–14)
APTT BLD: 26.5 SEC — LOW (ref 27.5–36.3)
AST SERPL-CCNC: 7 U/L — SIGNIFICANT CHANGE UP (ref 4–32)
BILIRUB SERPL-MCNC: 0.8 MG/DL — SIGNIFICANT CHANGE UP (ref 0.2–1.2)
BUN SERPL-MCNC: 18 MG/DL — SIGNIFICANT CHANGE UP (ref 7–23)
CALCIUM SERPL-MCNC: 9.3 MG/DL — SIGNIFICANT CHANGE UP (ref 8.4–10.5)
CHLORIDE SERPL-SCNC: 106 MMOL/L — SIGNIFICANT CHANGE UP (ref 98–107)
CO2 SERPL-SCNC: 21 MMOL/L — LOW (ref 22–31)
CREAT SERPL-MCNC: 0.38 MG/DL — LOW (ref 0.5–1.3)
GLUCOSE SERPL-MCNC: 114 MG/DL — HIGH (ref 70–99)
HCT VFR BLD CALC: 30 % — LOW (ref 34.5–45)
HGB BLD-MCNC: 9.1 G/DL — LOW (ref 11.5–15.5)
INR BLD: 1.26 — HIGH (ref 0.88–1.17)
MCHC RBC-ENTMCNC: 30.1 PG — SIGNIFICANT CHANGE UP (ref 27–34)
MCHC RBC-ENTMCNC: 30.3 % — LOW (ref 32–36)
MCV RBC AUTO: 99.3 FL — SIGNIFICANT CHANGE UP (ref 80–100)
NRBC # FLD: 0 K/UL — SIGNIFICANT CHANGE UP (ref 0–0)
PLATELET # BLD AUTO: 444 K/UL — HIGH (ref 150–400)
PMV BLD: 10 FL — SIGNIFICANT CHANGE UP (ref 7–13)
POTASSIUM SERPL-MCNC: 3.6 MMOL/L — SIGNIFICANT CHANGE UP (ref 3.5–5.3)
POTASSIUM SERPL-SCNC: 3.6 MMOL/L — SIGNIFICANT CHANGE UP (ref 3.5–5.3)
PROT SERPL-MCNC: 6.4 G/DL — SIGNIFICANT CHANGE UP (ref 6–8.3)
PROTHROM AB SERPL-ACNC: 14.5 SEC — HIGH (ref 9.8–13.1)
RBC # BLD: 3.02 M/UL — LOW (ref 3.8–5.2)
RBC # FLD: 15.1 % — HIGH (ref 10.3–14.5)
SODIUM SERPL-SCNC: 141 MMOL/L — SIGNIFICANT CHANGE UP (ref 135–145)
SPECIMEN SOURCE: SIGNIFICANT CHANGE UP
WBC # BLD: 13.35 K/UL — HIGH (ref 3.8–10.5)
WBC # FLD AUTO: 13.35 K/UL — HIGH (ref 3.8–10.5)

## 2020-02-16 PROCEDURE — 99233 SBSQ HOSP IP/OBS HIGH 50: CPT | Mod: GC

## 2020-02-16 RX ADMIN — Medication 0.5 GRAM(S): at 17:48

## 2020-02-16 RX ADMIN — LEVETIRACETAM 500 MILLIGRAM(S): 250 TABLET, FILM COATED ORAL at 06:03

## 2020-02-16 RX ADMIN — Medication 10 MILLIGRAM(S): at 11:39

## 2020-02-16 RX ADMIN — Medication 50 MILLIGRAM(S): at 06:03

## 2020-02-16 RX ADMIN — Medication 100 MILLIGRAM(S): at 06:03

## 2020-02-16 RX ADMIN — Medication 100 MILLIGRAM(S): at 22:41

## 2020-02-16 RX ADMIN — Medication 50 MILLIGRAM(S): at 17:48

## 2020-02-16 RX ADMIN — Medication 325 MILLIGRAM(S): at 11:39

## 2020-02-16 RX ADMIN — PANTOPRAZOLE SODIUM 40 MILLIGRAM(S): 20 TABLET, DELAYED RELEASE ORAL at 11:39

## 2020-02-16 RX ADMIN — Medication 0.5 GRAM(S): at 06:03

## 2020-02-16 RX ADMIN — ATORVASTATIN CALCIUM 80 MILLIGRAM(S): 80 TABLET, FILM COATED ORAL at 22:41

## 2020-02-16 RX ADMIN — Medication 100 MILLIGRAM(S): at 13:10

## 2020-02-16 RX ADMIN — LEVETIRACETAM 500 MILLIGRAM(S): 250 TABLET, FILM COATED ORAL at 17:48

## 2020-02-16 NOTE — DISCHARGE NOTE PROVIDER - NSDCCPCAREPLAN_GEN_ALL_CORE_FT
PRINCIPAL DISCHARGE DIAGNOSIS  Diagnosis: Cellulitis of right lower extremity  Assessment and Plan of Treatment: You had a cellulitis. You were treated with IV antibiotics and then pills as well. The cellulitis improved on these antibiotics.      SECONDARY DISCHARGE DIAGNOSES  Diagnosis: Hematoma of lower extremity, initial encounter  Assessment and Plan of Treatment: You were found to have a hematoma in your left lower extremity. It did not continue to grow. WIth time, it should reabsorb by itself.    Diagnosis: Baker cyst  Assessment and Plan of Treatment: You have a fluid collection behind your right knee. This can sometimes cause pain. Please use over the counter medicines like tylenol to control the pain from this fluid collection. With time, they can reabsorb as well. PRINCIPAL DISCHARGE DIAGNOSIS  Diagnosis: Cellulitis of right lower extremity  Assessment and Plan of Treatment: You had an infection of the skin of your right leg. The infection improved significantly on IV antibiotics. Please continue to take oral antibiotics for 4 more days and follow up with your [facility] doctor.      SECONDARY DISCHARGE DIAGNOSES  Diagnosis: Baker cyst  Assessment and Plan of Treatment: You have a fluid collection behind your right knee. This can sometimes cause pain. Please use over the counter medicines like tylenol to control the pain from this fluid collection. With time, they can reabsorb as well.    Diagnosis: Hematoma of lower extremity, initial encounter  Assessment and Plan of Treatment: You were found to have a hematoma in your left lower extremity. It did not continue to grow. With time, it should go away on its own. PRINCIPAL DISCHARGE DIAGNOSIS  Diagnosis: Cellulitis of right lower extremity  Assessment and Plan of Treatment: You had an infection of the skin of your right leg. The infection improved significantly on IV antibiotics. Please continue to take oral antibiotics for 4 more days and follow up with your [facility] doctor.      SECONDARY DISCHARGE DIAGNOSES  Diagnosis: Baker cyst  Assessment and Plan of Treatment: You have a fluid collection behind your right knee. This can sometimes cause pain. Please use over the counter medicines like tylenol to control the pain from this fluid collection. With time, they can reabsorb as well.    Diagnosis: Hematoma of lower extremity, initial encounter  Assessment and Plan of Treatment: You were found to have a hematoma in your left lower extremity. It did not continue to grow. With time, it should go away on its own.    Diagnosis: Anoxic brain injury  Assessment and Plan of Treatment: Please follow up with your primary care doctor. The [facility] care network number is [phone].

## 2020-02-16 NOTE — PROGRESS NOTE ADULT - ATTENDING COMMENTS
Patient was seen and examined personally by me. I have discussed the plan and reviewed the resident's note and agree with the above physical exam findings including assessment and plan except as indicated below.    76 y/o F PMH CAD with MI s/p CABG x5 in 1997 with bypass rupture a few hours after surgery resulting in severe anoxic brain injury with resulting functional quadriplegia, dementia p/w right lower leg cellulitis.     Erythema and swelling slightly improved. Continue with Ancef. Bcx NGTD  RLE US neg for DVT but concern for hematoma. Monitor RLE for compartment syndrome  DNR/DNI, family interested in home hospice, no aggressive interventions Patient was seen and examined personally by me. I have discussed the plan and reviewed the resident's note and agree with the above physical exam findings including assessment and plan except as indicated below.    74 y/o F PMH CAD with MI s/p CABG x5 in 1997 with bypass rupture a few hours after surgery resulting in severe anoxic brain injury with resulting functional quadriplegia and minimally verbal, dementia p/w right lower leg cellulitis.     Erythema and swelling slightly improved. Continue with Ancef. Bcx NGTD  RLE US neg for DVT but concern for hematoma. Monitor RLE for compartment syndrome  DNR/DNI, family interested in home hospice, no aggressive interventions

## 2020-02-16 NOTE — DISCHARGE NOTE PROVIDER - HOSPITAL COURSE
75 year old female with CAD with MI s/p CABG x5 in 1997 with bypass rupture a few hours after surgery resulting in severe anoxic brain injury with resulting functional quadriplegia, also with, vascular dementia, L breast cancer (dx 2002 s/p quadrantectomy), bilateral DVTs in 2004 (transiently on coumadin, but had to be discontinued to episodes of UGIB, now with IVC filter), GERD, PUD s/p multiple pRBC transfusions, rectal prolapse (with intermittent blood in stools), TIA in 2013, R ulna fracture in 2009, R femur fracture in 2012, R tibia/fibula fracture in 2014. Px with RLE swelling c/w cellulitis without DVT on duplex. Her cellulitis resolved on IV and then PO antibiotics.... 75F h/o CAD with MI s/p CABG x5 in 1997 with bypass rupture a few hours after surgery resulting in severe anoxic brain injury with resulting functional quadriplegia, also with, vascular dementia, L breast cancer (dx 2002 s/p quadrantectomy), bilateral DVTs in 2004 (transiently on coumadin, but had to be discontinued to episodes of UGIB, now with IVC filter), GERD, PUD s/p multiple pRBC transfusions, rectal prolapse (with intermittent blood in stools), TIA in 2013, R ulna fracture in 2009, R femur fracture in 2012, R tibia/fibula fracture in 2014. Px with RLE swelling c/w cellulitis without DVT on duplex. Her cellulitis improved significantly on IV cephazolin and will be discharged on PO cephalexin. 75F h/o CAD with MI s/p CABG x5 in 1997 with bypass rupture a few hours after surgery resulting in severe anoxic brain injury with resulting functional quadriplegia, also with, vascular dementia, L breast cancer (dx 2002 s/p quadrantectomy), bilateral DVTs in 2004 (transiently on coumadin, but had to be discontinued to episodes of UGIB, now with IVC filter), GERD, PUD s/p multiple pRBC transfusions, rectal prolapse (with intermittent blood in stools), TIA in 2013, R ulna fracture in 2009, R femur fracture in 2012, R tibia/fibula fracture in 2014. Px with RLE swelling c/w cellulitis without DVT on duplex. Her cellulitis improved significantly on IV cephazolin and will be discharged on 4 more days of PO cephalexin. 75F h/o CAD with MI s/p CABG x5 in 1997 with bypass rupture a few hours after surgery resulting in severe anoxic brain injury with resulting functional quadriplegia, also with, vascular dementia, L breast cancer (dx 2002 s/p quadrantectomy), bilateral DVTs in 2004 (transiently on coumadin, but had to be discontinued to episodes of UGIB, now with IVC filter), GERD, PUD s/p multiple pRBC transfusions, rectal prolapse (with intermittent blood in stools), TIA in 2013, R ulna fracture in 2009, R femur fracture in 2012, R tibia/fibula fracture in 2014. Px with RLE swelling c/w cellulitis without DVT on duplex. Her cellulitis improved significantly on IV cephazolin and will be discharged on 4 more days of PO cephalexin.        [facility]: [phone]

## 2020-02-16 NOTE — DISCHARGE NOTE PROVIDER - NSDCMRMEDTOKEN_GEN_ALL_CORE_FT
atorvastatin 80 mg oral tablet: 1 tab(s) orally once a day (at bedtime)  Caltrate 600 + D oral tablet: 1 tab(s) orally once a day  Carafate 1 g/10 mL oral suspension: 5 milliliter(s) orally 2 times a day  Colace sodium 100 mg oral capsule: 1 cap(s) orally 2 times a day  ferrous sulfate 325 mg oral tablet: 1 tab(s) orally once a day  Keppra 500 mg oral tablet: 1 tab(s) orally 2 times a day  metoprolol tartrate 50 mg oral tablet: 1 tab(s) orally 2 times a day  Paxil 10 mg oral tablet: 1 tab(s) orally once a day  Protonix 40 mg oral delayed release tablet: 1 tab(s) orally once a day  Zetia 10 mg oral tablet: 1 tab(s) orally once a day atorvastatin 80 mg oral tablet: 1 tab(s) orally once a day (at bedtime)  Caltrate 600 + D oral tablet: 1 tab(s) orally once a day  Carafate 1 g/10 mL oral suspension: 5 milliliter(s) orally 2 times a day  cephalexin 250 mg oral capsule: 1 cap(s) orally every 6 hours  Colace sodium 100 mg oral capsule: 1 cap(s) orally 2 times a day  ferrous sulfate 325 mg oral tablet: 1 tab(s) orally once a day  Keppra 500 mg oral tablet: 1 tab(s) orally 2 times a day  metoprolol tartrate 50 mg oral tablet: 1 tab(s) orally 2 times a day  Paxil 10 mg oral tablet: 1 tab(s) orally once a day  Protonix 40 mg oral delayed release tablet: 1 tab(s) orally once a day  Zetia 10 mg oral tablet: 1 tab(s) orally once a day

## 2020-02-16 NOTE — PROGRESS NOTE ADULT - SUBJECTIVE AND OBJECTIVE BOX
INCOMPLETE NOTE PROGRESS NOTE:     Patient is a 80y old  Female who presents with a chief complaint of Cellulitis (16 Feb 2020 08:43)      SUBJECTIVE / OVERNIGHT EVENTS:  No acute interval events  History cannot be obtained due to patient's mental status    ADDITIONAL REVIEW OF SYSTEMS:  History cannot be obtained due to patient's mental status    MEDICATIONS  (STANDING):  atorvastatin 80 milliGRAM(s) Oral at bedtime  ceFAZolin   IVPB 1000 milliGRAM(s) IV Intermittent every 8 hours  ferrous    sulfate 325 milliGRAM(s) Oral daily  levETIRAcetam 500 milliGRAM(s) Oral two times a day  metoprolol tartrate 50 milliGRAM(s) Oral two times a day  pantoprazole   Suspension 40 milliGRAM(s) Oral daily  PARoxetine 10 milliGRAM(s) Oral daily  sodium chloride 0.45%. 1000 milliLiter(s) (75 mL/Hr) IV Continuous <Continuous>  sucralfate suspension 0.5 Gram(s) Oral two times a day    MEDICATIONS  (PRN):      CAPILLARY BLOOD GLUCOSE        I&O's Summary    15 Feb 2020 07:01  -  16 Feb 2020 07:00  --------------------------------------------------------  IN: 750 mL / OUT: 0 mL / NET: 750 mL        PHYSICAL EXAM:  Vital Signs Last 24 Hrs  T(C): 36.8 (16 Feb 2020 06:01), Max: 36.8 (15 Feb 2020 22:18)  T(F): 98.3 (16 Feb 2020 06:01), Max: 98.3 (16 Feb 2020 06:01)  HR: 98 (16 Feb 2020 06:01) (77 - 98)  BP: 154/65 (16 Feb 2020 06:01) (138/62 - 154/65)  BP(mean): --  RR: 16 (16 Feb 2020 06:01) (16 - 18)  SpO2: 95% (16 Feb 2020 06:01) (95% - 98%)    CONSTITUTIONAL: Lying in bed, contracted. Eyes open. Stereotypical movements, repetitively patting head or chest.  CARDIOVASCULAR: Regular rate and rhythm. Normal S1 and S2. No murmurs.  RESPIRATORY: Normal respiratory effort, Lungs CTAB  EXTREMITIES: Right lower extremity erythema appears less erythematous but has not significantly receded from demarcation on RLE. Right LE hematoma is firm and unchanged in size.  ABDOMEN: Nontender to palpation, normoactive bowel sounds, no rebound/guarding; No hepatosplenomegaly  MUSCLOSKELETAL: no clubbing or cyanosis of digits; no joint swelling or tenderness to palpation  PSYCH: Nonverbal on exam.    LABS:                        9.1    13.35 )-----------( 444      ( 16 Feb 2020 07:05 )             30.0     02-16    141  |  106  |  18  ----------------------------<  114<H>  3.6   |  21<L>  |  0.38<L>    Ca    9.3      16 Feb 2020 07:05  Phos  2.8     02-15  Mg     2.1     02-15    TPro  6.4  /  Alb  2.8<L>  /  TBili  0.8  /  DBili  x   /  AST  7   /  ALT  5   /  AlkPhos  103  02-16    PT/INR - ( 16 Feb 2020 07:05 )   PT: 14.5 SEC;   INR: 1.26          PTT - ( 16 Feb 2020 07:05 )  PTT:26.5 SEC          Culture - Blood (collected 15 Feb 2020 06:22)  Source: BLOOD PERIPHERAL  Preliminary Report (16 Feb 2020 06:21):    NO ORGANISMS ISOLATED    NO ORGANISMS ISOLATED AT 24 HOURS        RADIOLOGY & ADDITIONAL TESTS:  Results Reviewed: Leukocytosis is likely stable, not uptrending. WBC from 2/15 likely diluted.  Imaging Personally Reviewed:  Electrocardiogram Personally Reviewed:    COORDINATION OF CARE:  Care Discussed with Consultants/Other Providers [Y/N]:  Prior or Outpatient Records Reviewed [Y/N]:

## 2020-02-16 NOTE — PROGRESS NOTE ADULT - PROBLEM SELECTOR PLAN 2
5.4 x 2.5 x 3.1 cm sized heterogeneous echogenic lesion in the proximal calf, likely hematoma.  -Drop in hgb is very likely dilutional given that all lines dropped.  -continue to monitor  -monitor HgB 5.4 x 2.5 x 3.1 cm sized heterogeneous echogenic lesion in the proximal calf, likely hematoma.  -continue to monitor  -monitor HgB

## 2020-02-16 NOTE — PROGRESS NOTE ADULT - PROBLEM SELECTOR PLAN 1
Pt with RLE swelling, redness and warmth. Febrile to 100.9   -Day 1/5 cefazolin 1 g q8h IV. May switch to orals after further clinical improvement.  -duplex without evidence of DVT   -low suspicion for septic joint however will get xray of the right foot and ankle to further assess for joint effusion   -f/u bldcx Pt with RLE swelling, redness and warmth. Febrile to 100.9   -Day 2/5 cefazolin 1 g q8h IV. May switch to orals after further clinical improvement.  -duplex without evidence of DVT    -f/u 48 hour bldcx

## 2020-02-16 NOTE — DISCHARGE NOTE PROVIDER - PROVIDER TOKENS
FREE:[LAST:[[facility]FIRST:[,],PHONE:[phone]],FAX:[(   )    -],ESTABLISHEDPATIENT:[T]]

## 2020-02-17 ENCOUNTER — TRANSCRIPTION ENCOUNTER (OUTPATIENT)
Age: 80
End: 2020-02-17

## 2020-02-17 VITALS
SYSTOLIC BLOOD PRESSURE: 144 MMHG | DIASTOLIC BLOOD PRESSURE: 59 MMHG | RESPIRATION RATE: 18 BRPM | HEART RATE: 63 BPM | OXYGEN SATURATION: 95 % | TEMPERATURE: 98 F

## 2020-02-17 LAB
ALBUMIN SERPL ELPH-MCNC: 3.1 G/DL — LOW (ref 3.3–5)
ALP SERPL-CCNC: 120 U/L — SIGNIFICANT CHANGE UP (ref 40–120)
ALT FLD-CCNC: 4 U/L — SIGNIFICANT CHANGE UP (ref 4–33)
ANION GAP SERPL CALC-SCNC: 11 MMO/L — SIGNIFICANT CHANGE UP (ref 7–14)
ANION GAP SERPL CALC-SCNC: 14 MMO/L — SIGNIFICANT CHANGE UP (ref 7–14)
AST SERPL-CCNC: 16 U/L — SIGNIFICANT CHANGE UP (ref 4–32)
BILIRUB SERPL-MCNC: 0.7 MG/DL — SIGNIFICANT CHANGE UP (ref 0.2–1.2)
BUN SERPL-MCNC: 20 MG/DL — SIGNIFICANT CHANGE UP (ref 7–23)
BUN SERPL-MCNC: 21 MG/DL — SIGNIFICANT CHANGE UP (ref 7–23)
CALCIUM SERPL-MCNC: 9 MG/DL — SIGNIFICANT CHANGE UP (ref 8.4–10.5)
CALCIUM SERPL-MCNC: 9.5 MG/DL — SIGNIFICANT CHANGE UP (ref 8.4–10.5)
CHLORIDE SERPL-SCNC: 111 MMOL/L — HIGH (ref 98–107)
CHLORIDE SERPL-SCNC: 112 MMOL/L — HIGH (ref 98–107)
CO2 SERPL-SCNC: 21 MMOL/L — LOW (ref 22–31)
CO2 SERPL-SCNC: 22 MMOL/L — SIGNIFICANT CHANGE UP (ref 22–31)
CREAT SERPL-MCNC: 0.41 MG/DL — LOW (ref 0.5–1.3)
CREAT SERPL-MCNC: 0.42 MG/DL — LOW (ref 0.5–1.3)
GLUCOSE SERPL-MCNC: 140 MG/DL — HIGH (ref 70–99)
GLUCOSE SERPL-MCNC: 143 MG/DL — HIGH (ref 70–99)
HCT VFR BLD CALC: 32.3 % — LOW (ref 34.5–45)
HGB BLD-MCNC: 9.9 G/DL — LOW (ref 11.5–15.5)
MAGNESIUM SERPL-MCNC: 2.1 MG/DL — SIGNIFICANT CHANGE UP (ref 1.6–2.6)
MCHC RBC-ENTMCNC: 30.2 PG — SIGNIFICANT CHANGE UP (ref 27–34)
MCHC RBC-ENTMCNC: 30.7 % — LOW (ref 32–36)
MCV RBC AUTO: 98.5 FL — SIGNIFICANT CHANGE UP (ref 80–100)
NRBC # FLD: 0 K/UL — SIGNIFICANT CHANGE UP (ref 0–0)
PHOSPHATE SERPL-MCNC: 2.6 MG/DL — SIGNIFICANT CHANGE UP (ref 2.5–4.5)
PLATELET # BLD AUTO: 431 K/UL — HIGH (ref 150–400)
PMV BLD: 9.6 FL — SIGNIFICANT CHANGE UP (ref 7–13)
POTASSIUM SERPL-MCNC: 3.7 MMOL/L — SIGNIFICANT CHANGE UP (ref 3.5–5.3)
POTASSIUM SERPL-MCNC: 4.1 MMOL/L — SIGNIFICANT CHANGE UP (ref 3.5–5.3)
POTASSIUM SERPL-SCNC: 3.7 MMOL/L — SIGNIFICANT CHANGE UP (ref 3.5–5.3)
POTASSIUM SERPL-SCNC: 4.1 MMOL/L — SIGNIFICANT CHANGE UP (ref 3.5–5.3)
PROT SERPL-MCNC: 6.9 G/DL — SIGNIFICANT CHANGE UP (ref 6–8.3)
RBC # BLD: 3.28 M/UL — LOW (ref 3.8–5.2)
RBC # FLD: 15.4 % — HIGH (ref 10.3–14.5)
SODIUM SERPL-SCNC: 144 MMOL/L — SIGNIFICANT CHANGE UP (ref 135–145)
SODIUM SERPL-SCNC: 147 MMOL/L — HIGH (ref 135–145)
WBC # BLD: 11.1 K/UL — HIGH (ref 3.8–10.5)
WBC # FLD AUTO: 11.1 K/UL — HIGH (ref 3.8–10.5)

## 2020-02-17 PROCEDURE — 99239 HOSP IP/OBS DSCHRG MGMT >30: CPT

## 2020-02-17 RX ORDER — ACETAMINOPHEN 500 MG
650 TABLET ORAL EVERY 6 HOURS
Refills: 0 | Status: DISCONTINUED | OUTPATIENT
Start: 2020-02-17 | End: 2020-02-17

## 2020-02-17 RX ORDER — CEPHALEXIN 500 MG
500 CAPSULE ORAL EVERY 6 HOURS
Refills: 0 | Status: DISCONTINUED | OUTPATIENT
Start: 2020-02-17 | End: 2020-02-17

## 2020-02-17 RX ORDER — CEPHALEXIN 500 MG
1 CAPSULE ORAL
Qty: 16 | Refills: 0
Start: 2020-02-17 | End: 2020-02-20

## 2020-02-17 RX ORDER — SODIUM CHLORIDE 9 MG/ML
1000 INJECTION, SOLUTION INTRAVENOUS
Refills: 0 | Status: DISCONTINUED | OUTPATIENT
Start: 2020-02-17 | End: 2020-02-17

## 2020-02-17 RX ORDER — CEPHALEXIN 500 MG
250 CAPSULE ORAL EVERY 6 HOURS
Refills: 0 | Status: DISCONTINUED | OUTPATIENT
Start: 2020-02-17 | End: 2020-02-17

## 2020-02-17 RX ADMIN — LEVETIRACETAM 500 MILLIGRAM(S): 250 TABLET, FILM COATED ORAL at 17:11

## 2020-02-17 RX ADMIN — LEVETIRACETAM 500 MILLIGRAM(S): 250 TABLET, FILM COATED ORAL at 05:31

## 2020-02-17 RX ADMIN — Medication 50 MILLIGRAM(S): at 05:31

## 2020-02-17 RX ADMIN — Medication 325 MILLIGRAM(S): at 11:46

## 2020-02-17 RX ADMIN — Medication 100 MILLIGRAM(S): at 05:31

## 2020-02-17 RX ADMIN — SODIUM CHLORIDE 75 MILLILITER(S): 9 INJECTION, SOLUTION INTRAVENOUS at 11:47

## 2020-02-17 RX ADMIN — Medication 50 MILLIGRAM(S): at 17:11

## 2020-02-17 RX ADMIN — PANTOPRAZOLE SODIUM 40 MILLIGRAM(S): 20 TABLET, DELAYED RELEASE ORAL at 11:47

## 2020-02-17 RX ADMIN — Medication 0.5 GRAM(S): at 17:11

## 2020-02-17 RX ADMIN — Medication 0.5 GRAM(S): at 05:31

## 2020-02-17 RX ADMIN — Medication 250 MILLIGRAM(S): at 11:47

## 2020-02-17 RX ADMIN — Medication 10 MILLIGRAM(S): at 11:47

## 2020-02-17 RX ADMIN — Medication 250 MILLIGRAM(S): at 17:11

## 2020-02-17 NOTE — PROGRESS NOTE ADULT - PROBLEM SELECTOR PLAN 4
1.5 cm Baker's cyst in the popliteal fossa.  -monitor for increased knee swelling or signs of pain   -pain meds as indicated 1.5 cm Baker's cyst in the popliteal fossa.  - Monitor for increased knee swelling or signs of pain   - Pain meds as indicated

## 2020-02-17 NOTE — PROGRESS NOTE ADULT - SUBJECTIVE AND OBJECTIVE BOX
PROGRESS NOTE:   Authored by Sathish Estveez MD, Pager [phone] [facility], [id number] [facility]     Patient is a 80y old  Female who presents with a chief complaint of Cellulitis (16 Feb 2020 08:43)      SUBJECTIVE / OVERNIGHT EVENTS:    ADDITIONAL REVIEW OF SYSTEMS:    MEDICATIONS  (STANDING):  atorvastatin 80 milliGRAM(s) Oral at bedtime  ceFAZolin   IVPB 1000 milliGRAM(s) IV Intermittent every 8 hours  ferrous    sulfate 325 milliGRAM(s) Oral daily  levETIRAcetam 500 milliGRAM(s) Oral two times a day  metoprolol tartrate 50 milliGRAM(s) Oral two times a day  pantoprazole   Suspension 40 milliGRAM(s) Oral daily  PARoxetine 10 milliGRAM(s) Oral daily  sodium chloride 0.45%. 1000 milliLiter(s) (75 mL/Hr) IV Continuous <Continuous>  sucralfate suspension 0.5 Gram(s) Oral two times a day    MEDICATIONS  (PRN):      CAPILLARY BLOOD GLUCOSE        I&O's Summary    16 Feb 2020 07:01  -  17 Feb 2020 07:00  --------------------------------------------------------  IN: 100 mL / OUT: 0 mL / NET: 100 mL        PHYSICAL EXAM:  Vital Signs Last 24 Hrs  T(C): 37.2 (17 Feb 2020 05:25), Max: 37.2 (17 Feb 2020 05:25)  T(F): 99 (17 Feb 2020 05:25), Max: 99 (17 Feb 2020 05:25)  HR: 96 (17 Feb 2020 05:25) (96 - 103)  BP: 155/70 (17 Feb 2020 05:25) (150/65 - 155/70)  BP(mean): --  RR: 18 (17 Feb 2020 05:25) (16 - 18)  SpO2: 96% (17 Feb 2020 05:25) (95% - 96%)    CONSTITUTIONAL: NAD, well-developed  RESPIRATORY: Normal respiratory effort; lungs are clear to auscultation bilaterally  CARDIOVASCULAR: Regular rate and rhythm, normal S1 and S2, no murmur/rub/gallop; No lower extremity edema; Peripheral pulses are 2+ bilaterally  ABDOMEN: Nontender to palpation, normoactive bowel sounds, no rebound/guarding; No hepatosplenomegaly  MUSCULOSKELETAL: no clubbing or cyanosis of digits; no joint swelling or tenderness to palpation  PSYCH: A+O to person, place, and time; affect appropriate    LABS:                        9.1    13.35 )-----------( 444      ( 16 Feb 2020 07:05 )             30.0     02-16    141  |  106  |  18  ----------------------------<  114<H>  3.6   |  21<L>  |  0.38<L>    Ca    9.3      16 Feb 2020 07:05    TPro  6.4  /  Alb  2.8<L>  /  TBili  0.8  /  DBili  x   /  AST  7   /  ALT  5   /  AlkPhos  103  02-16    PT/INR - ( 16 Feb 2020 07:05 )   PT: 14.5 SEC;   INR: 1.26          PTT - ( 16 Feb 2020 07:05 )  PTT:26.5 SEC          Culture - Blood (collected 15 Feb 2020 06:22)  Source: BLOOD PERIPHERAL  Preliminary Report (17 Feb 2020 06:21):    NO ORGANISMS ISOLATED    NO ORGANISMS ISOLATED AT 48 HRS.        RADIOLOGY & ADDITIONAL TESTS:  Results Reviewed:   Imaging Personally Reviewed:  Electrocardiogram Personally Reviewed:    COORDINATION OF CARE:  Care Discussed with Consultants/Other Providers [Y/N]:  Prior or Outpatient Records Reviewed [Y/N]: PROGRESS NOTE:   Authored by Sathish Estevez MD, Pager [phone] [facility], [id number] [facility]     Patient is a 80y old  Female who presents with a chief complaint of Cellulitis (16 Feb 2020 08:43)      SUBJECTIVE / OVERNIGHT EVENTS:  No acute events overnight. Patient is awake and moving her extremities spontaneously but is not answering any questions.    ADDITIONAL REVIEW OF SYSTEMS:  Unable to obtain ROS due to patient's current mental status.     MEDICATIONS  (STANDING):  atorvastatin 80 milliGRAM(s) Oral at bedtime  ceFAZolin   IVPB 1000 milliGRAM(s) IV Intermittent every 8 hours  ferrous    sulfate 325 milliGRAM(s) Oral daily  levETIRAcetam 500 milliGRAM(s) Oral two times a day  metoprolol tartrate 50 milliGRAM(s) Oral two times a day  pantoprazole   Suspension 40 milliGRAM(s) Oral daily  PARoxetine 10 milliGRAM(s) Oral daily  sodium chloride 0.45%. 1000 milliLiter(s) (75 mL/Hr) IV Continuous <Continuous>  sucralfate suspension 0.5 Gram(s) Oral two times a day      I&O's Summary    16 Feb 2020 07:01  -  17 Feb 2020 07:00  --------------------------------------------------------  IN: 100 mL / OUT: 0 mL / NET: 100 mL        PHYSICAL EXAM:  Vital Signs Last 24 Hrs  T(C): 37.2 (17 Feb 2020 05:25), Max: 37.2 (17 Feb 2020 05:25)  T(F): 99 (17 Feb 2020 05:25), Max: 99 (17 Feb 2020 05:25)  HR: 96 (17 Feb 2020 05:25) (96 - 103)  BP: 155/70 (17 Feb 2020 05:25) (150/65 - 155/70)  BP(mean): --  RR: 18 (17 Feb 2020 05:25) (16 - 18)  SpO2: 96% (17 Feb 2020 05:25) (95% - 96%)    CONSTITUTIONAL: Laying in bed, contracted  CARDIOVASCULAR: Regular rate and rhythm. Normal S1 and S2. No murmurs.  RESPIRATORY: Normal respiratory effort, Lungs CTAB  EXTREMITIES: Right lower extremity erythema significantly improved. Right LE hematoma is firm and unchanged in size.  ABDOMEN: Nontender to palpation, normoactive bowel sounds, no rebound/guarding; No hepatosplenomegaly  MUSCLOSKELETAL: no clubbing or cyanosis of digits; no joint swelling or tenderness to palpation  PSYCH: Responds to verbal stimuli, but non-verbal     LABS:                    9.9    11.10 )-----------( 431      ( 17 Feb 2020 07:25 )             32.3     17 Feb 2020 07:25    147    |  112    |  20     ----------------------------<  143    4.1     |  21     |  0.42     Ca    9.5        17 Feb 2020 07:25  Phos  2.6       17 Feb 2020 07:25  Mg     2.1       17 Feb 2020 07:25    TPro  6.9    /  Alb  3.1    /  TBili  0.7    /  DBili  x      /  AST  16     /  ALT  4      /  AlkPhos  120    17 Feb 2020 07:25    PT/INR - ( 16 Feb 2020 07:05 )   PT: 14.5 SEC;   INR: 1.26     PTT - ( 16 Feb 2020 07:05 )  PTT:26.5 SEC      LIVER FUNCTIONS - ( 17 Feb 2020 07:25 )  Alb: 3.1 g/dL / Pro: 6.9 g/dL / ALK PHOS: 120 u/L / ALT: 4 u/L / AST: 16 u/L / GGT: x             Culture - Blood (collected 15 Feb 2020 06:22)  Source: BLOOD PERIPHERAL  Preliminary Report (17 Feb 2020 06:21):    NO ORGANISMS ISOLATED    NO ORGANISMS ISOLATED AT 48 HRS.        RADIOLOGY & ADDITIONAL TESTS:  Results Reviewed:   Imaging Personally Reviewed:  Electrocardiogram Personally Reviewed:    COORDINATION OF CARE:  Care Discussed with Consultants/Other Providers [Y/N]:  Prior or Outpatient Records Reviewed [Y/N]:

## 2020-02-17 NOTE — PROGRESS NOTE ADULT - PROBLEM SELECTOR PLAN 5
CAD with MI s/p CABG x5 in 1997 with bypass rupture   -on zetia and lipitor at home  -c/w statin CAD with MI s/p CABG x5 in 1997 with bypass rupture   - on zetia and lipitor at home  - c/w statin

## 2020-02-17 NOTE — DISCHARGE NOTE NURSING/CASE MANAGEMENT/SOCIAL WORK - NSDCPNINST_GEN_ALL_CORE
If any complaints of nausea, vomiting, fever, or change in mental status call primary MD or return to emergency room

## 2020-02-17 NOTE — PROGRESS NOTE ADULT - ASSESSMENT
75 year old female with CAD with MI s/p CABG x5 in 1997 with bypass rupture a few hours after surgery resulting in severe anoxic brain injury with resulting functional quadriplegia, also with, vascular dementia, L breast cancer (dx 2002 s/p quadrantectomy), bilateral DVTs in 2004 (transiently on coumadin, but had to be discontinued to episodes of UGIB, now with IVC filter), GERD, PUD s/p multiple pRBC transfusions, rectal prolapse (with intermittent blood in stools), TIA in 2013, R ulna fracture in 2009, R femur fracture in 2012, R tibia/fibula fracture in 2014. Px with RLE swelling c/w cellulitis. No DVT on duplex. 75F h/o CAD with MI s/p CABG x5 in 1997 c/b bypass rupture resulting in severe anoxic brain injury and functional quadriplegia, vascular dementia, L breast cancer (dx 2002 s/p quadrantectomy), b/l DVTs 2004 (transiently on coumadin, stopped due to UGIB, now with IVC filter), GERD, PUD s/p multiple pRBC transfusions, rectal prolapse, TIA 2013, R ulna fracture 2009, R femur fracture in 2012, R tibia/fibula fracture in 2014 admitted for RLE swelling c/w cellulitis. No DVT on duplex.

## 2020-02-17 NOTE — PROGRESS NOTE ADULT - PROBLEM SELECTOR PLAN 1
Pt with RLE swelling, redness and warmth. Febrile to 100.9   -Day 2/5 cefazolin 1 g q8h IV. May switch to orals after further clinical improvement.  -duplex without evidence of DVT    -f/u 48 hour bldcx Pt with RLE swelling, redness and warmth. Febrile to 100.9   - c/w cefazolin 1 g q8h IV (d3 of 5)  - LE duplex without evidence of DVT    - f/u BCx

## 2020-02-17 NOTE — PROGRESS NOTE ADULT - PROBLEM SELECTOR PLAN 6
Hx of DVT in the pass; no DVT demonstrated on duplex today   -s/p IVC filter  -cannot anticoagulate due to bleeding risk Hx of DVT in the pass; no DVT demonstrated on duplex today   - s/p IVC filter  - No pharmacologic DVT ppx due to bleeding risk

## 2020-02-17 NOTE — PROGRESS NOTE ADULT - ATTENDING COMMENTS
Patient was seen and examined personally by me. I have discussed the plan and reviewed the resident's note and agree with the above physical exam findings including assessment and plan except as indicated below.    74 y/o F PMH CAD with MI s/p CABG x5 in 1997 with bypass rupture a few hours after surgery resulting in severe anoxic brain injury with resulting functional quadriplegia and minimally verbal, dementia p/w right lower leg cellulitis.     Says yes to pain of right leg.   Erythema and swelling improving. Bcx NGTD. Switch to Keflex to complete 7 day course  RLE US neg for DVT but concern for hematoma. Supportive measures, keep RLE elevated, avoid NSAIDs. Tylenol PRN for pain  Will give some IVF for mild hypernatremia and repeat bmp in afternoon  DNR/DNI, family interested in home hospice, no aggressive interventions  DC home once home services resumed and hypernatremia downtrending. DC time: 33 min

## 2020-02-17 NOTE — PROGRESS NOTE ADULT - PROBLEM SELECTOR PLAN 7
DVT: SCD due to bleeding risks  Diet: pureed diet  GI ppx: carafate and protonix  GOC: DNR/DNI/Limited medical interventions/no feeding tube DVT: SCD due to bleeding risks  Diet: Pureed diet  GI ppx: carafate and protonix  GOC: DNR/DNI/Limited medical interventions/no feeding tube  Dispo: Resume home services

## 2020-02-17 NOTE — PROGRESS NOTE ADULT - PROBLEM SELECTOR PLAN 8
Transitions of Care Status:  1.  Name of PCP:  2.  PCP Contacted on Admission: [ ] Y    [ ] N    3.  PCP contacted at Discharge: [ ] Y    [ ] N    [ ] N/A  4.  Post-Discharge Appointment Date and Location:  5.  Summary of Handoff given to PCP: Transitions of Care Status:  1.  Name of PCP: Unknown  2.  PCP Contacted on Admission: [ ] Y    [x] N    3.  PCP contacted at Discharge: [ ] Y    [ ] N    [ ] N/A  4.  Post-Discharge Appointment Date and Location:  5.  Summary of Handoff given to PCP:

## 2020-02-17 NOTE — PROGRESS NOTE ADULT - PROBLEM SELECTOR PLAN 2
5.4 x 2.5 x 3.1 cm sized heterogeneous echogenic lesion in the proximal calf, likely hematoma.  -continue to monitor  -monitor HgB 5.4 x 2.5 x 3.1 cm sized heterogeneous echogenic lesion in the proximal calf, likely hematoma.  - Monitor Hgb

## 2020-02-17 NOTE — DISCHARGE NOTE NURSING/CASE MANAGEMENT/SOCIAL WORK - PATIENT PORTAL LINK FT
You can access the FollowMyHealth Patient Portal offered by [facility] by registering at the following website: [email or web address]. By joining [employer]’s FollowMyHealth portal, you will also be able to view your health information using other applications (apps) compatible with our system.

## 2020-02-17 NOTE — PROGRESS NOTE ADULT - PROBLEM SELECTOR PLAN 3
-pt with hx of anoxic brain injury in 1997 with intermittent agitation  -c/w paxil  -c/w keppra for seizure ppx H/o anoxic brain injury in 1997 with intermittent agitation  - c/w paxil  - c/w keppra for seizure ppx

## 2020-02-17 NOTE — DISCHARGE NOTE NURSING/CASE MANAGEMENT/SOCIAL WORK - NSPROEXTENSIONSOFSELF_GEN_A_NUR
none I will START or STAY ON the medications listed below when I get home from the hospital:    spironolactone 25 mg oral tablet  -- 2 tab(s) by mouth 2 times a day  -- Indication: For Acute on chronic systolic congestive heart failure    aspirin 81 mg oral delayed release tablet  -- 1 tab(s) by mouth once a day  -- Indication: For CAD prophylaxis    gabapentin 100 mg oral capsule  -- 2 cap(s) by mouth 3 times a day  -- Indication: For Diabetic neuropathy    Tradjenta 5 mg oral tablet  -- 1 tab(s) by mouth once a day  -- Indication: For DM (diabetes mellitus)    bumetanide 1 mg oral tablet  -- 3 tab(s) by mouth 2 times a day  -- Indication: For Acute on chronic systolic congestive heart failure    milrinone 200 mcg/mL-D5% intravenous solution  -- at 0.375 mcg/kg/minute intravenously continuous drip via PICC    -- Indication: For Acute on chronic systolic congestive heart failure    magnesium oxide  -- ***see internal chadwick***  -- Indication: For Electrolyte imbalance    rifAXIMin 550 mg oral tablet  -- 1 tab(s) by mouth 2 times a day  -- Indication: For Elevated liver enzymes    calcium acetate 667 mg oral tablet  -- 2001 milligram(s) by mouth 3 times a day  -- Indication: For Hypocalcemia    pantoprazole 40 mg oral delayed release tablet  -- 1 tab(s) by mouth once a day (before a meal)  -- Indication: For GERD    levothyroxine 175 mcg (0.175 mg) oral tablet  -- 1 tab(s) by mouth once a day  -- Indication: For Hypothyroidism    calcium-vitamin D  -- 1 tab(s) by mouth 3 times a day  -- Indication: For Hypoparathyroidism    calcitriol 0.5 mcg oral capsule  -- 1 cap(s) by mouth once a day  -- Indication: For Hypoparathyroidism I will START or STAY ON the medications listed below when I get home from the hospital:    spironolactone 25 mg oral tablet  -- 2 tab(s) by mouth 2 times a day  -- Indication: For Acute on chronic systolic congestive heart failure    aspirin 81 mg oral delayed release tablet  -- 1 tab(s) by mouth once a day  -- Indication: For CAD prophylaxis    gabapentin 100 mg oral capsule  -- 2 cap(s) by mouth 3 times a day  -- Indication: For Diabetic neuropathy    Tradjenta 5 mg oral tablet  -- 1 tab(s) by mouth once a day  -- Indication: For DM (diabetes mellitus)    benzonatate 100 mg oral capsule  -- 1 cap(s) by mouth 3 times a day, As needed, Cough  -- Indication: For Cough     bumetanide 1 mg oral tablet  -- 3 tab(s) by mouth 2 times a day  -- Indication: For Acute on chronic systolic congestive heart failure    potassium chloride 20 mEq oral tablet, extended release  -- 1 tab(s) by mouth once a day  -- Indication: For Acute on chronic systolic congestive heart failure    magnesium oxide 400 mg (241.3 mg elemental magnesium) oral tablet  -- 1 tab(s) by mouth 2 times a day (with meals)  -- Indication: For Acute on chronic systolic congestive heart failure    rifAXIMin 550 mg oral tablet  -- 1 tab(s) by mouth 2 times a day  -- Indication: For Elevated liver enzymes    calcium acetate 667 mg oral tablet  -- 2001 milligram(s) by mouth 3 times a day  -- Indication: For Hypocalcemia    pantoprazole 40 mg oral delayed release tablet  -- 1 tab(s) by mouth once a day (before a meal)  -- Indication: For GERD    levothyroxine 175 mcg (0.175 mg) oral tablet  -- 1 tab(s) by mouth once a day  -- Indication: For Hypothyroidism    calcium-vitamin D  -- 1 tab(s) by mouth 3 times a day  -- Indication: For Hypoparathyroidism    calcitriol 0.5 mcg oral capsule  -- 1 cap(s) by mouth once a day  -- Indication: For Hypoparathyroidism

## 2020-02-20 LAB — BACTERIA BLD CULT: SIGNIFICANT CHANGE UP

## 2020-02-25 ENCOUNTER — APPOINTMENT (OUTPATIENT)
Dept: HOME HEALTH SERVICES | Facility: HOME HEALTH | Age: 80
End: 2020-02-25
Payer: MEDICARE

## 2020-02-25 VITALS
SYSTOLIC BLOOD PRESSURE: 120 MMHG | RESPIRATION RATE: 16 BRPM | HEART RATE: 65 BPM | DIASTOLIC BLOOD PRESSURE: 70 MMHG | OXYGEN SATURATION: 97 %

## 2020-02-25 DIAGNOSIS — N39.0 URINARY TRACT INFECTION, SITE NOT SPECIFIED: ICD-10-CM

## 2020-02-25 DIAGNOSIS — Z87.81 PERSONAL HISTORY OF (HEALED) TRAUMATIC FRACTURE: ICD-10-CM

## 2020-02-25 DIAGNOSIS — Z86.718 PERSONAL HISTORY OF OTHER VENOUS THROMBOSIS AND EMBOLISM: ICD-10-CM

## 2020-02-25 DIAGNOSIS — Z71.89 OTHER SPECIFIED COUNSELING: ICD-10-CM

## 2020-02-25 DIAGNOSIS — Z85.3 PERSONAL HISTORY OF MALIGNANT NEOPLASM OF BREAST: ICD-10-CM

## 2020-02-25 DIAGNOSIS — G45.9 TRANSIENT CEREBRAL ISCHEMIC ATTACK, UNSPECIFIED: ICD-10-CM

## 2020-02-25 DIAGNOSIS — H40.9 UNSPECIFIED GLAUCOMA: ICD-10-CM

## 2020-02-25 DIAGNOSIS — K62.3 RECTAL PROLAPSE: ICD-10-CM

## 2020-02-25 DIAGNOSIS — E78.5 HYPERLIPIDEMIA, UNSPECIFIED: ICD-10-CM

## 2020-02-25 DIAGNOSIS — S52.209A UNSPECIFIED FRACTURE OF SHAFT OF UNSPECIFIED ULNA, INITIAL ENCOUNTER FOR CLOSED FRACTURE: ICD-10-CM

## 2020-02-25 PROCEDURE — 99345 HOME/RES VST NEW HIGH MDM 75: CPT

## 2020-02-25 NOTE — COUNSELING
[Underweight - ( BMI  <18.5 )] : underweight - ( BMI  <18.5 ) [Continue diet as tolerated] : continue diet as tolerated based on goals of care [Non - Smoker] : non-smoker [FreeTextEntry3] : pureed food, thin liquids

## 2020-02-25 NOTE — REASON FOR VISIT
[Initial Evaluation] : an initial evaluation [Family Member] : family member [Formal Caregiver] : formal caregiver [Pre-Visit Preparation] : pre-visit preparation was done [FreeTextEntry1] : anoxic enchephalopathy [FreeTextEntry2] : reviewed d/c

## 2020-02-25 NOTE — CHRONIC CARE ASSESSMENT
[PPS Score: ____] : Palliative Performance Scale (PPS) Score: [unfilled] [de-identified] : none [de-identified] : pureed food

## 2020-02-25 NOTE — HISTORY OF PRESENT ILLNESS
[FreeTextEntry2] : 75F h/o CAD with MI s/p CABG x5 in 1997 complicated by anoxic brain injury, dementia,  stage 4 decub, hx DVTs, IVC filter, PUD, GI bleed,  breast CA, multiple fx, TIA\par \par Admitted 2/14-2/17 for hematoma and cellulitis of her leg. Treated with cefazolin/cephalexin, \par \par anoxic encephalopathy. functional quadriplegia. spoon fed purees, regular liquids. no signs of aspiration. Split shift care\par seizure d/o- last sz > 1 year ago. on Keppra\par Decubitus ulcer- gets VNS care, has air mattress. \par CAD- on aspirin, atorvastatin, zetia, metoprolol

## 2020-02-25 NOTE — ASSESSMENT
[FreeTextEntry1] : Patient requires the use of a transport wheelchair for  mobility.  Pt has the diagnosis of anoxic encephalopathy  which severely limits their ability to ambulate and thus a wheelchair will greatly benefit them in activities of daily living in their home such as toileting, dressing, bathing and grooming. A cane or walker has been ruled out. She  has not expressed an unwillingness to use the wheelchair in their home, which provides adequate space for maneuvering the mobility device.\par She has a caregiver willing and able to propel patient.\par \par

## 2020-03-05 DIAGNOSIS — R26.2 DIFFICULTY IN WALKING, NOT ELSEWHERE CLASSIFIED: ICD-10-CM

## 2020-03-05 DIAGNOSIS — Z74.01 BED CONFINEMENT STATUS: ICD-10-CM

## 2020-03-23 RX ORDER — LEVETIRACETAM 500 MG/1
500 TABLET, FILM COATED ORAL TWICE DAILY
Qty: 180 | Refills: 3 | Status: COMPLETED | COMMUNITY
Start: 2020-02-21 | End: 2020-03-23

## 2020-06-02 ENCOUNTER — APPOINTMENT (OUTPATIENT)
Dept: HOME HEALTH SERVICES | Facility: HOME HEALTH | Age: 80
End: 2020-06-02
Payer: MEDICARE

## 2020-06-02 DIAGNOSIS — L03.119 CELLULITIS OF UNSPECIFIED PART OF LIMB: ICD-10-CM

## 2020-06-02 PROCEDURE — 99349 HOME/RES VST EST MOD MDM 40: CPT | Mod: 95

## 2020-06-02 NOTE — PHYSICAL EXAM
[Normal Affect] : the affect was normal [de-identified] : Alert NAD [de-identified] : contorted posture with multiple contractures.  [de-identified] : wound not examined

## 2020-06-02 NOTE — CHRONIC CARE ASSESSMENT
[PPS Score: ____] : Palliative Performance Scale (PPS) Score: [unfilled] [de-identified] : none [de-identified] : pureed food

## 2020-06-02 NOTE — HISTORY OF PRESENT ILLNESS
[FreeTextEntry2] : JUDY GUTIERREZ is being seen for a visit provided via telehealth real-time audio visual technology.  JUDY GUTIERREZ was located at their home, [address] [place] [address], at the time of the visit.  [facility] clinician, SAMARA MENDOZA, was located remotely at her home in [place] at the time of the visit. The patient, JUDY GUTIERREZ, and [facility] clinician, SAMARA MENDOZA, participated in the telehealth encounter. Other participants included: son Florecita GUTIERREZ  or his/her representative consents to the use of telehealth. All questions related to telehealth answered.\par \par 75F h/o CAD with MI s/p CABG x5 in 1997 complicated by anoxic brain injury, dementia,  stage 4 decub, hx DVTs, IVC filter, PUD, GI bleed,  breast CA, multiple fx, TIA\par \par anoxic encephalopathy. functional quadriplegia. spoon fed purees, regular liquids. no signs of aspiration. Split shift care\par seizure d/o- last sz > 1 year ago. on Keppra\par Decubitus ulcer- originally stage 4.  gets VNS care, has air mattress. Per aide the wound is improved ~1 cm. \par CAD- on aspirin, atorvastatin, zetia, metoprolol

## 2020-07-15 ENCOUNTER — APPOINTMENT (OUTPATIENT)
Dept: HOME HEALTH SERVICES | Facility: HOME HEALTH | Age: 80
End: 2020-07-15

## 2020-08-17 ENCOUNTER — APPOINTMENT (OUTPATIENT)
Dept: HOME HEALTH SERVICES | Facility: HOME HEALTH | Age: 80
End: 2020-08-17
Payer: MEDICARE

## 2020-08-17 PROCEDURE — 99349 HOME/RES VST EST MOD MDM 40: CPT | Mod: 95

## 2020-08-17 NOTE — PHYSICAL EXAM
[Normal Affect] : the affect was normal [No Respiratory Distress] : no respiratory distress [de-identified] : Alert NAD [de-identified] : contorted posture with multiple contractures.  [de-identified] : ~1 cm x 0.5 cm full thickness wound on sacrum

## 2020-08-17 NOTE — CHRONIC CARE ASSESSMENT
[PPS Score: ____] : Palliative Performance Scale (PPS) Score: [unfilled] [de-identified] : none [de-identified] : pureed food

## 2020-08-17 NOTE — HISTORY OF PRESENT ILLNESS
[FreeTextEntry2] : JUDY GUTIERREZ is being seen for a visit provided via telehealth real-time audio visual technology.  JUDY GUTIERREZ was located at their home, [address] [place] [address], at the time of the visit.  [facility] clinician, SAMARA MENDOZA, was located remotely at her home in [place] at the time of the visit. The patient, JUDY GUTIERREZ, and [facility] clinician, SAMARA MENDOZA, participated in the telehealth encounter. Other participants included: son Cassie.  JUDY GUTIERREZ  or his/her representative consents to the use of telehealth. All questions related to telehealth answered.\par \par 80F h/o CAD with MI s/p CABG x5 in 1997 complicated by anoxic brain injury, dementia,  stage 4 decub, hx DVTs, IVC filter, PUD, GI bleed,  breast CA, multiple fx, TIA\par \par anoxic encephalopathy. functional quadriplegia. spoon fed purees. Split shift care.\par Aide reports not taking fluids well. lets water fall out of her mouth. \par seizure d/o- last sz > 1 year ago. on Keppra\par Decubitus ulcer- stage 4. Shrinking gets VNS care, has air mattress.\par CAD- on aspirin, atorvastatin, zetia, metoprolol.

## 2020-08-17 NOTE — COUNSELING
[Underweight - ( BMI  <18.5 )] : underweight - ( BMI  <18.5 ) [Non - Smoker] : non-smoker [Continue diet as tolerated] : continue diet as tolerated based on goals of care [FreeTextEntry3] : pureed food, thin liquids

## 2020-08-17 NOTE — REASON FOR VISIT
[Family Member] : family member [Formal Caregiver] : formal caregiver [Pre-Visit Preparation] : pre-visit preparation was done [Follow-Up] : a follow-up visit [FreeTextEntry1] : anoxic enchephalopathy [FreeTextEntry2] : reviewed d/c

## 2020-08-20 LAB
ALBUMIN SERPL ELPH-MCNC: 4.2 G/DL
ALP BLD-CCNC: 91 U/L
ALT SERPL-CCNC: 31 U/L
ANION GAP SERPL CALC-SCNC: 11 MMOL/L
AST SERPL-CCNC: 30 U/L
BASOPHILS # BLD AUTO: 0.05 K/UL
BASOPHILS NFR BLD AUTO: 0.7 %
BILIRUB SERPL-MCNC: 0.4 MG/DL
BUN SERPL-MCNC: 14 MG/DL
CALCIUM SERPL-MCNC: 10 MG/DL
CHLORIDE SERPL-SCNC: 105 MMOL/L
CO2 SERPL-SCNC: 30 MMOL/L
CREAT SERPL-MCNC: 0.51 MG/DL
EOSINOPHIL # BLD AUTO: 0.08 K/UL
EOSINOPHIL NFR BLD AUTO: 1.2 %
GLUCOSE SERPL-MCNC: 89 MG/DL
HCT VFR BLD CALC: 44.6 %
HGB BLD-MCNC: 13.6 G/DL
IMM GRANULOCYTES NFR BLD AUTO: 0.1 %
LEVETIRACETAM SERPL-MCNC: 17.5 MCG/ML
LYMPHOCYTES # BLD AUTO: 2.69 K/UL
LYMPHOCYTES NFR BLD AUTO: 39.8 %
MAN DIFF?: NORMAL
MCHC RBC-ENTMCNC: 30.5 GM/DL
MCHC RBC-ENTMCNC: 31.8 PG
MCV RBC AUTO: 104.2 FL
MONOCYTES # BLD AUTO: 0.53 K/UL
MONOCYTES NFR BLD AUTO: 7.8 %
NEUTROPHILS # BLD AUTO: 3.4 K/UL
NEUTROPHILS NFR BLD AUTO: 50.4 %
PLATELET # BLD AUTO: 222 K/UL
POTASSIUM SERPL-SCNC: 4.6 MMOL/L
PROT SERPL-MCNC: 6.5 G/DL
RBC # BLD: 4.28 M/UL
RBC # FLD: 13.9 %
SODIUM SERPL-SCNC: 146 MMOL/L
TSH SERPL-ACNC: 1.57 UIU/ML
WBC # FLD AUTO: 6.76 K/UL

## 2020-10-09 ENCOUNTER — MED ADMIN CHARGE (OUTPATIENT)
Age: 80
End: 2020-10-09

## 2020-10-09 ENCOUNTER — APPOINTMENT (OUTPATIENT)
Dept: HOME HEALTH SERVICES | Facility: HOME HEALTH | Age: 80
End: 2020-10-09

## 2020-11-25 ENCOUNTER — APPOINTMENT (OUTPATIENT)
Dept: HOME HEALTH SERVICES | Facility: HOME HEALTH | Age: 80
End: 2020-11-25
Payer: MEDICARE

## 2020-11-25 VITALS
SYSTOLIC BLOOD PRESSURE: 120 MMHG | RESPIRATION RATE: 16 BRPM | DIASTOLIC BLOOD PRESSURE: 60 MMHG | HEART RATE: 45 BPM | OXYGEN SATURATION: 95 %

## 2020-11-25 PROCEDURE — 99350 HOME/RES VST EST HIGH MDM 60: CPT

## 2020-11-25 NOTE — REASON FOR VISIT
[Follow-Up] : a follow-up visit [Family Member] : family member [Formal Caregiver] : formal caregiver [Pre-Visit Preparation] : pre-visit preparation was done [FreeTextEntry1] : anoxic enchephalopathy [FreeTextEntry2] : reviewed d/c

## 2020-11-25 NOTE — CHRONIC CARE ASSESSMENT
[PPS Score: ____] : Palliative Performance Scale (PPS) Score: [unfilled] [de-identified] : none [de-identified] : pureed food

## 2020-11-25 NOTE — HISTORY OF PRESENT ILLNESS
[FreeTextEntry2] : Patient denies fever, cough, trouble breathing, rash, vomiting and diarrhea. Patient has not been in close contact with someone covid positive. \par N95 mask, gloves, eye wear used during visit. Total face to face time with patient is 20min.\par \par 80F h/o CAD with MI s/p CABG x5 in 1997 complicated by anoxic brain injury, dementia,  stage 4 decub, hx DVTs, IVC filter, PUD, GI bleed,  breast CA, multiple fx, TIA\par \par anoxic encephalopathy. functional quadriplegia. spoon fed purees, thickened fluids. Split shift care.\par seizure d/o- last sz > 1 year ago. on Keppra\par Decubitus ulcer- stage 4. Shrinking gets VNS care weekly, has air mattress.\par CAD- on aspirin, atorvastatin, zetia, metoprolol.

## 2020-11-25 NOTE — PHYSICAL EXAM
[No Respiratory Distress] : no respiratory distress [Normal Affect] : the affect was normal [Normal Rate] : heart rate was normal  [Regular Rhythm] : with a regular rhythm [Normal S1, S2] : normal S1 and S2 [Normal Bowel Sounds] : normal bowel sounds [Non Tender] : non-tender [Soft] : abdomen soft [No Joint Swelling] : no joint swelling seen [de-identified] : Alert NAD [de-identified] : contorted posture with multiple contractures.  [de-identified] : ~1 cm x 0.5 cm stage 3 wound on sacrum

## 2020-11-25 NOTE — COUNSELING
[Underweight - ( BMI  <18.5 )] : underweight - ( BMI  <18.5 ) [Continue diet as tolerated] : continue diet as tolerated based on goals of care [Non - Smoker] : non-smoker [Decrease hospital use] : decrease hospital use [Likely to achieve goals/desired outcomes] : likely to achieve goals/desired outcomes [Completed Medical Orders for Life-Sustaining Treatment] : completed medical orders for life-sustaining treatment [DNR] : Code Status: DNR [Limited] : Treatment Guidelines: Limited [DNI] : Intubation: DNI [FreeTextEntry3] : pureed food, thin liquids

## 2020-12-04 ENCOUNTER — NON-APPOINTMENT (OUTPATIENT)
Age: 80
End: 2020-12-04

## 2020-12-07 DIAGNOSIS — Z20.828 CONTACT WITH AND (SUSPECTED) EXPOSURE TO OTHER VIRAL COMMUNICABLE DISEASES: ICD-10-CM

## 2020-12-07 LAB — SARS-COV-2 N GENE NPH QL NAA+PROBE: NOT DETECTED

## 2020-12-29 NOTE — ED PROVIDER NOTE - CHIEF COMPLAINT
The patient is a 80y Female complaining of 5-Fu Pregnancy And Lactation Text: This medication is Pregnancy Category X and contraindicated in pregnancy and in women who may become pregnant. It is unknown if this medication is excreted in breast milk.

## 2021-01-15 ENCOUNTER — APPOINTMENT (OUTPATIENT)
Dept: HOME HEALTH SERVICES | Facility: HOME HEALTH | Age: 81
End: 2021-01-15

## 2021-01-27 ENCOUNTER — NON-APPOINTMENT (OUTPATIENT)
Age: 81
End: 2021-01-27

## 2021-02-01 ENCOUNTER — APPOINTMENT (OUTPATIENT)
Dept: HOME HEALTH SERVICES | Facility: HOME HEALTH | Age: 81
End: 2021-02-01

## 2021-02-01 RX ORDER — COLLAGENASE SANTYL 250 [ARB'U]/G
250 OINTMENT TOPICAL
Qty: 90 | Refills: 0 | Status: COMPLETED | COMMUNITY
Start: 2020-08-16

## 2021-03-22 ENCOUNTER — NON-APPOINTMENT (OUTPATIENT)
Age: 81
End: 2021-03-22

## 2021-03-22 ENCOUNTER — LABORATORY RESULT (OUTPATIENT)
Age: 81
End: 2021-03-22

## 2021-03-22 ENCOUNTER — APPOINTMENT (OUTPATIENT)
Dept: HOME HEALTH SERVICES | Facility: HOME HEALTH | Age: 81
End: 2021-03-22

## 2021-03-22 VITALS
DIASTOLIC BLOOD PRESSURE: 66 MMHG | OXYGEN SATURATION: 97 % | HEART RATE: 54 BPM | TEMPERATURE: 97.1 F | SYSTOLIC BLOOD PRESSURE: 118 MMHG | RESPIRATION RATE: 14 BRPM

## 2021-03-22 RX ORDER — CALCIUM/D3/MAG OX/COP/MANG/ZN 600 MG-20
600-800 TABLET ORAL
Refills: 0 | Status: ACTIVE | COMMUNITY
Start: 2020-02-25

## 2021-03-22 RX ORDER — KETOCONAZOLE 20 MG/G
2 CREAM TOPICAL
Qty: 60 | Refills: 0 | Status: ACTIVE | COMMUNITY
Start: 2020-08-16

## 2021-03-22 RX ORDER — CHLORHEXIDINE GLUCONATE 4 %
325 (65 FE) LIQUID (ML) TOPICAL
Refills: 0 | Status: ACTIVE | COMMUNITY
Start: 2020-02-21

## 2021-03-23 LAB
ALBUMIN SERPL ELPH-MCNC: 3.8 G/DL
ALP BLD-CCNC: 111 U/L
ALT SERPL-CCNC: 48 U/L
ANION GAP SERPL CALC-SCNC: 10 MMOL/L
APPEARANCE: ABNORMAL
AST SERPL-CCNC: 48 U/L
BASOPHILS # BLD AUTO: 0.05 K/UL
BASOPHILS NFR BLD AUTO: 0.7 %
BILIRUB SERPL-MCNC: 0.3 MG/DL
BILIRUBIN URINE: NEGATIVE
BLOOD URINE: ABNORMAL
BUN SERPL-MCNC: 10 MG/DL
CALCIUM SERPL-MCNC: 9.9 MG/DL
CHLORIDE SERPL-SCNC: 100 MMOL/L
CO2 SERPL-SCNC: 27 MMOL/L
COLOR: NORMAL
CREAT SERPL-MCNC: 0.42 MG/DL
EOSINOPHIL # BLD AUTO: 0.07 K/UL
EOSINOPHIL NFR BLD AUTO: 0.9 %
GLUCOSE QUALITATIVE U: NEGATIVE
HCT VFR BLD CALC: 37.9 %
HGB BLD-MCNC: 12.2 G/DL
IMM GRANULOCYTES NFR BLD AUTO: 0.4 %
KETONES URINE: NEGATIVE
LEUKOCYTE ESTERASE URINE: ABNORMAL
LYMPHOCYTES # BLD AUTO: 2.94 K/UL
LYMPHOCYTES NFR BLD AUTO: 39.6 %
MAN DIFF?: NORMAL
MCHC RBC-ENTMCNC: 32 PG
MCHC RBC-ENTMCNC: 32.2 GM/DL
MCV RBC AUTO: 99.5 FL
MONOCYTES # BLD AUTO: 0.69 K/UL
MONOCYTES NFR BLD AUTO: 9.3 %
NEUTROPHILS # BLD AUTO: 3.64 K/UL
NEUTROPHILS NFR BLD AUTO: 49.1 %
NITRITE URINE: POSITIVE
PH URINE: 7.5
PLATELET # BLD AUTO: 282 K/UL
POTASSIUM SERPL-SCNC: 4.7 MMOL/L
PROT SERPL-MCNC: 6.3 G/DL
PROTEIN URINE: ABNORMAL
RBC # BLD: 3.81 M/UL
RBC # FLD: 13.1 %
SODIUM SERPL-SCNC: 138 MMOL/L
SPECIFIC GRAVITY URINE: 1.01
UROBILINOGEN URINE: NORMAL
WBC # FLD AUTO: 7.42 K/UL

## 2021-03-25 LAB — BACTERIA UR CULT: ABNORMAL

## 2021-04-03 ENCOUNTER — NON-APPOINTMENT (OUTPATIENT)
Age: 81
End: 2021-04-03

## 2021-04-07 ENCOUNTER — APPOINTMENT (OUTPATIENT)
Dept: HOME HEALTH SERVICES | Facility: HOME HEALTH | Age: 81
End: 2021-04-07

## 2021-04-07 ENCOUNTER — APPOINTMENT (OUTPATIENT)
Dept: HOME HEALTH SERVICES | Facility: HOME HEALTH | Age: 81
End: 2021-04-07
Payer: MEDICARE

## 2021-04-07 VITALS
TEMPERATURE: 97.9 F | RESPIRATION RATE: 16 BRPM | SYSTOLIC BLOOD PRESSURE: 120 MMHG | HEART RATE: 48 BPM | OXYGEN SATURATION: 97 % | DIASTOLIC BLOOD PRESSURE: 50 MMHG

## 2021-04-07 PROCEDURE — 0031A: CPT

## 2021-04-07 RX ORDER — CIPROFLOXACIN HYDROCHLORIDE 250 MG/1
250 TABLET, FILM COATED ORAL
Qty: 14 | Refills: 0 | Status: COMPLETED | COMMUNITY
Start: 2021-03-25 | End: 2021-04-07

## 2021-04-07 RX ORDER — SULFAMETHOXAZOLE AND TRIMETHOPRIM 800; 160 MG/1; MG/1
800-160 TABLET ORAL TWICE DAILY
Qty: 14 | Refills: 0 | Status: COMPLETED | COMMUNITY
Start: 2021-03-23 | End: 2021-04-07

## 2021-05-05 ENCOUNTER — APPOINTMENT (OUTPATIENT)
Dept: HOME HEALTH SERVICES | Facility: HOME HEALTH | Age: 81
End: 2021-05-05
Payer: MEDICARE

## 2021-05-05 VITALS
RESPIRATION RATE: 16 BRPM | DIASTOLIC BLOOD PRESSURE: 60 MMHG | HEART RATE: 50 BPM | SYSTOLIC BLOOD PRESSURE: 120 MMHG | OXYGEN SATURATION: 96 %

## 2021-05-05 DIAGNOSIS — Z23 ENCOUNTER FOR IMMUNIZATION: ICD-10-CM

## 2021-05-05 DIAGNOSIS — Z86.2 PERSONAL HISTORY OF DISEASES OF THE BLOOD AND BLOOD-FORMING ORGANS AND CERTAIN DISORDERS INVOLVING THE IMMUNE MECHANISM: ICD-10-CM

## 2021-05-05 DIAGNOSIS — R13.19 OTHER DYSPHAGIA: ICD-10-CM

## 2021-05-05 DIAGNOSIS — S80.10XA CONTUSION OF UNSPECIFIED LOWER LEG, INITIAL ENCOUNTER: ICD-10-CM

## 2021-05-05 PROCEDURE — 99350 HOME/RES VST EST HIGH MDM 60: CPT

## 2021-05-05 NOTE — REASON FOR VISIT
[Follow-Up] : a follow-up visit [Family Member] : family member [Formal Caregiver] : formal caregiver [Intercurrent Specialty/Sub-specialty Visits] : the patient has no intercurrent specialty/sub-specialty visits [FreeTextEntry1] : anoxic enchephalopathy

## 2021-05-05 NOTE — CHRONIC CARE ASSESSMENT
[PPS Score: ____] : Palliative Performance Scale (PPS) Score: [unfilled] [de-identified] : none [de-identified] : pureed food

## 2021-05-05 NOTE — PHYSICAL EXAM
[No Respiratory Distress] : no respiratory distress [Normal Rate] : heart rate was normal  [Regular Rhythm] : with a regular rhythm [Normal S1, S2] : normal S1 and S2 [Normal Bowel Sounds] : normal bowel sounds [Non Tender] : non-tender [Soft] : abdomen soft [No Joint Swelling] : no joint swelling seen [Normal Affect] : the affect was normal [de-identified] : Alert NAD [de-identified] : contorted posture with multiple contractures.  [de-identified] : intact skin on sacrum

## 2021-05-05 NOTE — HISTORY OF PRESENT ILLNESS
[FreeTextEntry2] : Patient denies fever, cough, trouble breathing, rash, vomiting and diarrhea. Patient has not been in close contact with someone covid positive. \par Mask, gloves, eye wear used during visit. Total face to face time with patient is 20min.\par \par 81F h/o CAD with MI s/p CABG x5 in 1997 complicated by anoxic brain injury, dementia,  stage 4 decub, hx DVTs, IVC filter, PUD, GI bleed,  breast CA, multiple fx, TIA\par \par \par anoxic encephalopathy. functional quadriplegia. spoon fed purees, thickened fluids. Split shift care.\par seizure d/o- last sz > 1 year ago. on Keppra\par Decubitus ulcer- stage 4 wound has closed!\par CAD- on aspirin, atorvastatin, zetia, metoprolol. \par aide reports dark stool for many months

## 2021-05-25 NOTE — SURGICAL HISTORY
Emmie called clinic inquiring about colonoscopy that Dr. Stover has ordered based on her Pet Scan. Emmie stated that her mother has not received a call from scheduling. Emmie was given phone number for [facility] [phone] to schedule colonoscopy. Danya Lara RN,BSN,OCN     [PSH Reviewed and Updated] : past surgical history reviewed and updated

## 2021-06-14 ENCOUNTER — APPOINTMENT (OUTPATIENT)
Dept: HOME HEALTH SERVICES | Facility: HOME HEALTH | Age: 81
End: 2021-06-14

## 2021-08-02 ENCOUNTER — APPOINTMENT (OUTPATIENT)
Dept: HOME HEALTH SERVICES | Facility: HOME HEALTH | Age: 81
End: 2021-08-02
Payer: MEDICARE

## 2021-08-02 VITALS
SYSTOLIC BLOOD PRESSURE: 120 MMHG | DIASTOLIC BLOOD PRESSURE: 50 MMHG | HEART RATE: 48 BPM | RESPIRATION RATE: 16 BRPM | OXYGEN SATURATION: 96 %

## 2021-08-02 DIAGNOSIS — K21.9 GASTRO-ESOPHAGEAL REFLUX DISEASE W/OUT ESOPHAGITIS: ICD-10-CM

## 2021-08-02 PROCEDURE — 99349 HOME/RES VST EST MOD MDM 40: CPT

## 2021-08-16 ENCOUNTER — RX RENEWAL (OUTPATIENT)
Age: 81
End: 2021-08-16

## 2021-08-27 ENCOUNTER — NON-APPOINTMENT (OUTPATIENT)
Age: 81
End: 2021-08-27

## 2021-09-01 ENCOUNTER — RX RENEWAL (OUTPATIENT)
Age: 81
End: 2021-09-01

## 2021-09-01 ENCOUNTER — APPOINTMENT (OUTPATIENT)
Dept: HOME HEALTH SERVICES | Facility: HOME HEALTH | Age: 81
End: 2021-09-01

## 2021-10-04 ENCOUNTER — RX RENEWAL (OUTPATIENT)
Age: 81
End: 2021-10-04

## 2021-10-04 NOTE — PHYSICAL EXAM
[No Respiratory Distress] : no respiratory distress [Normal Rate] : heart rate was normal  [Regular Rhythm] : with a regular rhythm [Normal S1, S2] : normal S1 and S2 [Normal Bowel Sounds] : normal bowel sounds [Non Tender] : non-tender [Soft] : abdomen soft [No Joint Swelling] : no joint swelling seen [Normal Affect] : the affect was normal [Normal Sclera/Conjunctiva] : normal sclera/conjunctiva [Normal Outer Ear/Nose] : the ears and nose were normal in appearance [No JVD] : no jugular venous distention [No Rash] : no rash [No Skin Lesions] : no skin lesions [de-identified] : Alert NAD [FreeTextEntry1] : large external hemmorrhoids [de-identified] : contorted posture with multiple contractures.  [de-identified] : intact skin on sacrum

## 2021-10-04 NOTE — CHRONIC CARE ASSESSMENT
[PPS Score: ____] : Palliative Performance Scale (PPS) Score: [unfilled] [de-identified] : none [de-identified] : pureed food

## 2021-10-04 NOTE — HISTORY OF PRESENT ILLNESS
[Formal Caregiver] : formal caregiver [FreeTextEntry1] : encephalopathy [FreeTextEntry2] : 81F h/o CAD with MI s/p CABG x5 in 1997 complicated by anoxic brain injury, dementia,  stage 4 decub, hx DVTs, IVC filter, PUD, GI bleed,  breast CA, multiple fx, TIA\par \par anoxic encephalopathy. functional quadriplegia. spoon fed purees, thickened fluids. Split shift care.\par seizure d/o- last sz > 1 year ago. on Keppra\par Decubitus ulcer- stage 4 wound  still closed!\par CAD- on aspirin, atorvastatin, zetia, metoprolol. \par \par Bed is not functioning to raise bed. \par

## 2021-10-14 ENCOUNTER — APPOINTMENT (OUTPATIENT)
Dept: HOME HEALTH SERVICES | Facility: HOME HEALTH | Age: 81
End: 2021-10-14
Payer: MEDICARE

## 2021-10-14 VITALS
SYSTOLIC BLOOD PRESSURE: 100 MMHG | DIASTOLIC BLOOD PRESSURE: 60 MMHG | OXYGEN SATURATION: 93 % | RESPIRATION RATE: 16 BRPM | HEART RATE: 48 BPM

## 2021-10-14 PROCEDURE — G0008: CPT

## 2021-10-14 PROCEDURE — 99349 HOME/RES VST EST MOD MDM 40: CPT | Mod: 25

## 2021-10-14 PROCEDURE — 90662 IIV NO PRSV INCREASED AG IM: CPT

## 2021-10-19 NOTE — PHYSICAL EXAM
[Normal Sclera/Conjunctiva] : normal sclera/conjunctiva [Normal Outer Ear/Nose] : the ears and nose were normal in appearance [No JVD] : no jugular venous distention [No Respiratory Distress] : no respiratory distress [Normal Rate] : heart rate was normal  [Regular Rhythm] : with a regular rhythm [Normal S1, S2] : normal S1 and S2 [Normal Bowel Sounds] : normal bowel sounds [Non Tender] : non-tender [Soft] : abdomen soft [No Joint Swelling] : no joint swelling seen [No Rash] : no rash [No Skin Lesions] : no skin lesions [Normal Affect] : the affect was normal [de-identified] : Alert NAD [FreeTextEntry1] : large external hemmorrhoids [de-identified] : contorted posture with multiple contractures.  [de-identified] : intact skin on sacrum

## 2021-10-19 NOTE — CHRONIC CARE ASSESSMENT
[PPS Score: ____] : Palliative Performance Scale (PPS) Score: [unfilled] [de-identified] : none [de-identified] : pureed food

## 2021-10-19 NOTE — HISTORY OF PRESENT ILLNESS
[Formal Caregiver] : formal caregiver [FreeTextEntry1] : encephalopathy [FreeTextEntry2] : 81F h/o CAD with MI s/p CABG x5 in 1997 complicated by anoxic brain injury, dementia,  stage 4 decub, hx DVTs, IVC filter, PUD, GI bleed,  breast CA, multiple fx, TIA\par \par anoxic encephalopathy. functional quadriplegia. spoon fed purees, thickened fluids. Split shift care.\par seizure d/o- last sz > 1 year ago. on Keppra\par Urinary retention. Aide caths ~BID in addition to spontaneous (incomplete) voids\par Decubitus ulcer- hx stage 4 wound  still closed\par CAD- on aspirin, atorvastatin, zetia, metoprolol. \par No acute issues\par due for flu shot\par \par

## 2021-10-28 ENCOUNTER — RX RENEWAL (OUTPATIENT)
Age: 81
End: 2021-10-28

## 2021-11-14 ENCOUNTER — NON-APPOINTMENT (OUTPATIENT)
Age: 81
End: 2021-11-14

## 2021-11-15 ENCOUNTER — LABORATORY RESULT (OUTPATIENT)
Age: 81
End: 2021-11-15

## 2021-11-16 ENCOUNTER — LABORATORY RESULT (OUTPATIENT)
Age: 81
End: 2021-11-16

## 2021-11-16 DIAGNOSIS — N39.0 URINARY TRACT INFECTION, SITE NOT SPECIFIED: ICD-10-CM

## 2021-11-22 PROBLEM — N39.0 UTI (URINARY TRACT INFECTION): Status: RESOLVED | Noted: 2021-11-22 | Resolved: 2021-12-22

## 2021-12-03 ENCOUNTER — NON-APPOINTMENT (OUTPATIENT)
Age: 81
End: 2021-12-03

## 2021-12-07 ENCOUNTER — APPOINTMENT (OUTPATIENT)
Dept: HOME HEALTH SERVICES | Facility: HOME HEALTH | Age: 81
End: 2021-12-07

## 2021-12-08 ENCOUNTER — APPOINTMENT (OUTPATIENT)
Dept: HOME HEALTH SERVICES | Facility: HOME HEALTH | Age: 81
End: 2021-12-08
Payer: MEDICARE

## 2021-12-08 VITALS
HEART RATE: 56 BPM | OXYGEN SATURATION: 99 % | SYSTOLIC BLOOD PRESSURE: 130 MMHG | RESPIRATION RATE: 16 BRPM | DIASTOLIC BLOOD PRESSURE: 60 MMHG | TEMPERATURE: 96 F

## 2021-12-08 PROCEDURE — 0064A: CPT

## 2022-01-01 ENCOUNTER — LABORATORY RESULT (OUTPATIENT)
Age: 82
End: 2022-01-01

## 2022-01-01 ENCOUNTER — NON-APPOINTMENT (OUTPATIENT)
Age: 82
End: 2022-01-01

## 2022-01-01 ENCOUNTER — APPOINTMENT (OUTPATIENT)
Dept: HOME HEALTH SERVICES | Facility: HOME HEALTH | Age: 82
End: 2022-01-01

## 2022-01-01 ENCOUNTER — RX RENEWAL (OUTPATIENT)
Age: 82
End: 2022-01-01

## 2022-01-01 VITALS
SYSTOLIC BLOOD PRESSURE: 120 MMHG | HEART RATE: 62 BPM | RESPIRATION RATE: 16 BRPM | TEMPERATURE: 96.6 F | OXYGEN SATURATION: 97 % | DIASTOLIC BLOOD PRESSURE: 62 MMHG

## 2022-01-01 VITALS
SYSTOLIC BLOOD PRESSURE: 155 MMHG | DIASTOLIC BLOOD PRESSURE: 70 MMHG | OXYGEN SATURATION: 96 % | HEART RATE: 70 BPM | RESPIRATION RATE: 16 BRPM

## 2022-01-01 VITALS
HEART RATE: 59 BPM | RESPIRATION RATE: 16 BRPM | SYSTOLIC BLOOD PRESSURE: 105 MMHG | OXYGEN SATURATION: 96 % | DIASTOLIC BLOOD PRESSURE: 60 MMHG

## 2022-01-01 VITALS
RESPIRATION RATE: 16 BRPM | HEART RATE: 51 BPM | DIASTOLIC BLOOD PRESSURE: 60 MMHG | OXYGEN SATURATION: 95 % | SYSTOLIC BLOOD PRESSURE: 135 MMHG

## 2022-01-01 DIAGNOSIS — R33.9 RETENTION OF URINE, UNSPECIFIED: ICD-10-CM

## 2022-01-01 DIAGNOSIS — R05.9 COUGH, UNSPECIFIED: ICD-10-CM

## 2022-01-01 DIAGNOSIS — Z23 ENCOUNTER FOR IMMUNIZATION: ICD-10-CM

## 2022-01-01 PROCEDURE — 99348 HOME/RES VST EST LOW MDM 30: CPT | Mod: CS

## 2022-01-01 PROCEDURE — 99349 HOME/RES VST EST MOD MDM 40: CPT | Mod: 25

## 2022-01-01 PROCEDURE — 0004A: CPT

## 2022-01-01 PROCEDURE — G0008: CPT

## 2022-01-01 PROCEDURE — 90662 IIV NO PRSV INCREASED AG IM: CPT

## 2022-01-01 RX ORDER — EZETIMIBE 10 MG/1
10 TABLET ORAL
Qty: 90 | Refills: 3 | Status: ACTIVE | COMMUNITY
Start: 2020-02-21 | End: 1900-01-01

## 2022-01-01 RX ORDER — NIRMATRELVIR AND RITONAVIR 300-100 MG
20 X 150 MG & KIT ORAL
Qty: 1 | Refills: 0 | Status: COMPLETED | COMMUNITY
Start: 2022-01-01 | End: 2022-01-01

## 2022-01-01 RX ORDER — PANTOPRAZOLE 40 MG/1
40 TABLET, DELAYED RELEASE ORAL
Qty: 90 | Refills: 3 | Status: ACTIVE | COMMUNITY
Start: 2020-02-21 | End: 1900-01-01

## 2022-01-01 RX ORDER — SUCRALFATE 1 G/10ML
1 SUSPENSION ORAL
Qty: 2100 | Refills: 3 | Status: ACTIVE | COMMUNITY
Start: 2020-06-02 | End: 1900-01-01

## 2022-01-01 RX ORDER — SULFAMETHOXAZOLE AND TRIMETHOPRIM 800; 160 MG/1; MG/1
800-160 TABLET ORAL TWICE DAILY
Qty: 14 | Refills: 0 | Status: COMPLETED | COMMUNITY
Start: 2021-11-22 | End: 2022-01-01

## 2022-01-04 ENCOUNTER — NON-APPOINTMENT (OUTPATIENT)
Age: 82
End: 2022-01-04

## 2022-01-19 ENCOUNTER — NON-APPOINTMENT (OUTPATIENT)
Age: 82
End: 2022-01-19

## 2022-01-21 ENCOUNTER — APPOINTMENT (OUTPATIENT)
Dept: HOME HEALTH SERVICES | Facility: HOME HEALTH | Age: 82
End: 2022-01-21
Payer: MEDICARE

## 2022-01-21 VITALS
OXYGEN SATURATION: 98 % | RESPIRATION RATE: 16 BRPM | DIASTOLIC BLOOD PRESSURE: 80 MMHG | SYSTOLIC BLOOD PRESSURE: 140 MMHG | HEART RATE: 53 BPM

## 2022-01-21 PROCEDURE — 99349 HOME/RES VST EST MOD MDM 40: CPT

## 2022-01-21 NOTE — PHYSICAL EXAM
[Normal Sclera/Conjunctiva] : normal sclera/conjunctiva [Normal Outer Ear/Nose] : the ears and nose were normal in appearance [No JVD] : no jugular venous distention [No Respiratory Distress] : no respiratory distress [Normal Rate] : heart rate was normal  [Regular Rhythm] : with a regular rhythm [Normal S1, S2] : normal S1 and S2 [Normal Bowel Sounds] : normal bowel sounds [Non Tender] : non-tender [Soft] : abdomen soft [No Joint Swelling] : no joint swelling seen [No Rash] : no rash [No Skin Lesions] : no skin lesions [Normal Affect] : the affect was normal [de-identified] : Alert NAD [FreeTextEntry1] : large external hemmorrhoids [de-identified] : contorted posture with multiple contractures, including neck contracture. Unable to extend neck [de-identified] : intact skin on sacrum

## 2022-01-21 NOTE — HISTORY OF PRESENT ILLNESS
[Formal Caregiver] : formal caregiver [FreeTextEntry1] : encephalopathy [FreeTextEntry2] : 81 F h/o CAD with MI s/p CABG x5 in 1997 complicated by anoxic brain injury, dementia,  stage 4 decub, hx DVTs, IVC filter, PUD, GI bleed,  breast CA, multiple fx, TIA\par \par No acute issues\par cough mostly resolved. cvoid test was never done but aides both tested negative.\par anoxic encephalopathy. functional quadriplegia. spoon fed purees, thickened fluids. Split shift care.\par seizure d/o- last sz > 1 year ago. on Keppra\par Urinary retention. Aide caths ~BID in addition to spontaneous (incomplete) voids\par Decubitus ulcer- hx stage 4 wound  still closed\par CAD- on aspirin, atorvastatin, zetia, metoprolol. \par \par \par \par

## 2022-01-21 NOTE — COUNSELING
[Underweight - ( BMI  <18.5 )] : underweight - ( BMI  <18.5 ) [Continue diet as tolerated] : continue diet as tolerated based on goals of care [Non - Smoker] : non-smoker [Decrease hospital use] : decrease hospital use [Likely to achieve goals/desired outcomes] : likely to achieve goals/desired outcomes [Completed Medical Orders for Life-Sustaining Treatment] : completed medical orders for life-sustaining treatment [DNR] : Code Status: DNR [Limited] : Treatment Guidelines: Limited [DNI] : Intubation: DNI [Smoker, not interested in quitting] : smoker, not interested in quitting [Use assistive device to avoid falls] : use assistive device to avoid falls [FreeTextEntry3] : pureed food, thin liquids

## 2022-01-21 NOTE — CHRONIC CARE ASSESSMENT
[PPS Score: ____] : Palliative Performance Scale (PPS) Score: [unfilled] [de-identified] : none [de-identified] : pureed food

## 2022-02-02 ENCOUNTER — RX RENEWAL (OUTPATIENT)
Age: 82
End: 2022-02-02

## 2022-04-13 ENCOUNTER — NON-APPOINTMENT (OUTPATIENT)
Age: 82
End: 2022-04-13

## 2022-04-13 ENCOUNTER — APPOINTMENT (OUTPATIENT)
Dept: HOME HEALTH SERVICES | Facility: HOME HEALTH | Age: 82
End: 2022-04-13
Payer: MEDICARE

## 2022-04-13 VITALS
SYSTOLIC BLOOD PRESSURE: 110 MMHG | OXYGEN SATURATION: 93 % | RESPIRATION RATE: 16 BRPM | DIASTOLIC BLOOD PRESSURE: 70 MMHG | HEART RATE: 59 BPM

## 2022-04-13 PROCEDURE — 99349 HOME/RES VST EST MOD MDM 40: CPT

## 2022-04-13 NOTE — COUNSELING
[Underweight - ( BMI  <18.5 )] : underweight - ( BMI  <18.5 ) [Continue diet as tolerated] : continue diet as tolerated based on goals of care [Non - Smoker] : non-smoker [Smoker, not interested in quitting] : smoker, not interested in quitting [Use assistive device to avoid falls] : use assistive device to avoid falls [Decrease hospital use] : decrease hospital use [Likely to achieve goals/desired outcomes] : likely to achieve goals/desired outcomes [Completed Medical Orders for Life-Sustaining Treatment] : completed medical orders for life-sustaining treatment [DNR] : Code Status: DNR [Limited] : Treatment Guidelines: Limited [DNI] : Intubation: DNI [FreeTextEntry3] : pureed food, thin liquids

## 2022-04-13 NOTE — CHRONIC CARE ASSESSMENT
[PPS Score: ____] : Palliative Performance Scale (PPS) Score: [unfilled] [de-identified] : none [de-identified] : pureed food

## 2022-04-13 NOTE — PHYSICAL EXAM
[Normal Sclera/Conjunctiva] : normal sclera/conjunctiva [Normal Outer Ear/Nose] : the ears and nose were normal in appearance [No JVD] : no jugular venous distention [No Respiratory Distress] : no respiratory distress [Normal Rate] : heart rate was normal  [Regular Rhythm] : with a regular rhythm [Normal S1, S2] : normal S1 and S2 [Normal Bowel Sounds] : normal bowel sounds [Non Tender] : non-tender [Soft] : abdomen soft [No Joint Swelling] : no joint swelling seen [No Rash] : no rash [No Skin Lesions] : no skin lesions [Normal Affect] : the affect was normal [de-identified] : Alert /70, 59, 16, 93% [FreeTextEntry1] : large external hemmorrhoids [de-identified] : contorted posture with multiple contractures, including neck contracture. Unable to extend neck [de-identified] : very mild abrasion on sacral skin. scarring from prior wounds

## 2022-04-13 NOTE — HISTORY OF PRESENT ILLNESS
[Formal Caregiver] : formal caregiver [FreeTextEntry1] : encephalopathy [FreeTextEntry2] : 82 F h/o CAD with MI s/p CABG x5 in 1997 complicated by anoxic brain injury, dementia,  hx stage 4 decub, hx DVTs, IVC filter, PUD, GI bleed,  hx breast CA, multiple fx, TIA\par \par \par anoxic encephalopathy. functional quadriplegia. spoon fed purees, thickened fluids. Split shift care.\par seizure d/o- last sz > 1 year ago. on Keppra\par Urinary retention. Aide caths ~BID in addition to spontaneous (incomplete) voids\par Decubitus ulcer- hx stage 4 wound  has mild abrasion of the skin on sacrum. RYANN is broken\par CAD- on aspirin, atorvastatin, zetia, metoprolol. \par \par \par \par

## 2022-07-08 NOTE — HISTORY OF PRESENT ILLNESS
[Formal Caregiver] : formal caregiver [FreeTextEntry1] : encephalopathy [FreeTextEntry2] : 82 F h/o CAD with MI s/p CABG x5 in 1997 complicated by anoxic brain injury, dementia,  hx stage 4 decub, hx DVTs, IVC filter, PUD, GI bleed,  hx breast CA, multiple fx, TIA\par \par no acute issues. Needs covid booster\par anoxic encephalopathy. functional quadriplegia. spoon fed purees, thickened fluids. Split shift care.\par seizure d/o- last sz > 1 year ago. on Keppra\par Urinary retention. Aide caths ~BID in addition to spontaneous (incomplete) voids\par hx Decubitus ulcer-no current issues. Just got RYANN replaced\par CAD- on aspirin, atorvastatin, zetia, metoprolol.

## 2022-07-08 NOTE — CHRONIC CARE ASSESSMENT
[PPS Score: ____] : Palliative Performance Scale (PPS) Score: [unfilled] [de-identified] : none [de-identified] : pureed food

## 2022-07-08 NOTE — PHYSICAL EXAM
[Normal Sclera/Conjunctiva] : normal sclera/conjunctiva [Normal Outer Ear/Nose] : the ears and nose were normal in appearance [No JVD] : no jugular venous distention [No Respiratory Distress] : no respiratory distress [Normal Rate] : heart rate was normal  [Regular Rhythm] : with a regular rhythm [Normal S1, S2] : normal S1 and S2 [Normal Bowel Sounds] : normal bowel sounds [Non Tender] : non-tender [Soft] : abdomen soft [No Joint Swelling] : no joint swelling seen [No Rash] : no rash [No Skin Lesions] : no skin lesions [Normal Affect] : the affect was normal [de-identified] : Alert /70, 59, 16, 93% [FreeTextEntry1] : large external hemmorrhoids [de-identified] : contorted posture with multiple contractures, including neck contracture. Unable to extend neck [de-identified] : very mild abrasion on sacral skin. scarring from prior wounds

## 2022-09-21 PROBLEM — Z23 ENCOUNTER FOR IMMUNIZATION: Status: ACTIVE | Noted: 2020-10-09

## 2022-09-21 PROBLEM — R33.9 URINARY RETENTION: Status: ACTIVE | Noted: 2021-09-01

## 2022-09-21 NOTE — HISTORY OF PRESENT ILLNESS
[Formal Caregiver] : formal caregiver [FreeTextEntry1] : encephalopathy [FreeTextEntry2] : 82 F h/o CAD with MI s/p CABG x5 in 1997 complicated by anoxic brain injury, dementia,  hx stage 4 decub, hx DVTs, IVC filter, PUD, GI bleed,  hx breast CA, multiple fx, TIA\par \par no acute issues. \par anoxic encephalopathy. functional quadriplegia. spoon fed purees, thickened fluids. Split shift care.\par seizure d/o- last sz > 1 year ago. on Keppra\par Urinary retention. Aide caths ~BID in addition to spontaneous (incomplete) voids. Needs new catheters\par no skin issues\par CAD- on aspirin, atorvastatin, zetia, metoprolol.

## 2022-09-21 NOTE — CHRONIC CARE ASSESSMENT
[PPS Score: ____] : Palliative Performance Scale (PPS) Score: [unfilled] [de-identified] : none [de-identified] : pureed food

## 2022-09-21 NOTE — PHYSICAL EXAM
[Normal Sclera/Conjunctiva] : normal sclera/conjunctiva [Normal Outer Ear/Nose] : the ears and nose were normal in appearance [No JVD] : no jugular venous distention [No Respiratory Distress] : no respiratory distress [Normal Rate] : heart rate was normal  [Regular Rhythm] : with a regular rhythm [Normal S1, S2] : normal S1 and S2 [Normal Bowel Sounds] : normal bowel sounds [Non Tender] : non-tender [Soft] : abdomen soft [No Joint Swelling] : no joint swelling seen [No Rash] : no rash [No Skin Lesions] : no skin lesions [Normal Affect] : the affect was normal [de-identified] : thin, contracted [FreeTextEntry1] : large external hemmorrhoids [de-identified] : contorted posture with multiple contractures, including neck contracture. Unable to extend neck [de-identified] : very mild abrasion on sacral skin. scarring from prior wounds

## 2022-12-14 PROBLEM — R05.9 COUGH: Status: ACTIVE | Noted: 2022-01-01

## 2022-12-14 NOTE — HISTORY OF PRESENT ILLNESS
[FreeTextEntry1] : encephalopathy [FreeTextEntry2] : 82 F h/o CAD with MI s/p CABG x5 in 1997 complicated by anoxic brain injury, dementia,  hx stage 4 decub, hx DVTs, IVC filter, PUD, GI bleed,  hx breast CA, multiple fx, TIA\par \par pt s/p COVID dx 12/1. still coughing. \par anoxic encephalopathy. functional quadriplegia. spoon fed purees, thickened fluids. Split shift care.\par seizure d/o- last sz > 1 year ago. on Keppra\par Urinary retention. Aide caths ~BID in addition to spontaneous (incomplete) voids.\par no skin issues\par CAD- on aspirin, atorvastatin, zetia, metoprolol.

## 2022-12-14 NOTE — REASON FOR VISIT
[Intercurrent Specialty/Sub-specialty Visits] : the patient has no intercurrent specialty/sub-specialty visits [FreeTextEntry1] : anoxic enchephalopathy

## 2022-12-14 NOTE — PHYSICAL EXAM
[de-identified] : thin, contracted, won't hold up head. 105/60  59   96% [FreeTextEntry1] : large external hemmorrhoids [de-identified] : contorted posture with multiple contractures, including neck contracture. Unable to extend neck [de-identified] : very mild abrasion on sacral skin. scarring from prior wounds

## 2023-01-01 ENCOUNTER — APPOINTMENT (OUTPATIENT)
Dept: HOME HEALTH SERVICES | Facility: HOME HEALTH | Age: 83
End: 2023-01-01
Payer: MEDICARE

## 2023-01-01 ENCOUNTER — NON-APPOINTMENT (OUTPATIENT)
Age: 83
End: 2023-01-01

## 2023-01-01 ENCOUNTER — APPOINTMENT (OUTPATIENT)
Dept: HOME HEALTH SERVICES | Facility: HOME HEALTH | Age: 83
End: 2023-01-01

## 2023-01-01 ENCOUNTER — LABORATORY RESULT (OUTPATIENT)
Age: 83
End: 2023-01-01

## 2023-01-01 ENCOUNTER — RX RENEWAL (OUTPATIENT)
Age: 83
End: 2023-01-01

## 2023-01-01 VITALS
RESPIRATION RATE: 16 BRPM | DIASTOLIC BLOOD PRESSURE: 65 MMHG | HEART RATE: 65 BPM | OXYGEN SATURATION: 94 % | SYSTOLIC BLOOD PRESSURE: 130 MMHG

## 2023-01-01 VITALS
OXYGEN SATURATION: 96 % | RESPIRATION RATE: 14 BRPM | DIASTOLIC BLOOD PRESSURE: 65 MMHG | SYSTOLIC BLOOD PRESSURE: 110 MMHG | TEMPERATURE: 96.2 F | HEART RATE: 56 BPM

## 2023-01-01 VITALS
OXYGEN SATURATION: 97 % | SYSTOLIC BLOOD PRESSURE: 130 MMHG | DIASTOLIC BLOOD PRESSURE: 50 MMHG | HEART RATE: 57 BPM | RESPIRATION RATE: 16 BRPM

## 2023-01-01 DIAGNOSIS — R39.9 UNSPECIFIED SYMPTOMS AND SIGNS INVOLVING THE GENITOURINARY SYSTEM: ICD-10-CM

## 2023-01-01 DIAGNOSIS — I25.10 ATHEROSCLEROTIC HEART DISEASE OF NATIVE CORONARY ARTERY W/OUT ANGINA PECTORIS: ICD-10-CM

## 2023-01-01 DIAGNOSIS — G93.1 ANOXIC BRAIN DAMAGE, NOT ELSEWHERE CLASSIFIED: ICD-10-CM

## 2023-01-01 DIAGNOSIS — R53.2 FUNCTIONAL QUADRIPLEGIA: ICD-10-CM

## 2023-01-01 DIAGNOSIS — L89.154 PRESSURE ULCER OF SACRAL REGION, STAGE 4: ICD-10-CM

## 2023-01-01 DIAGNOSIS — Z87.19 PERSONAL HISTORY OF OTHER DISEASES OF THE DIGESTIVE SYSTEM: ICD-10-CM

## 2023-01-01 DIAGNOSIS — U07.1 COVID-19: ICD-10-CM

## 2023-01-01 DIAGNOSIS — G40.909 EPILEPSY, UNSPECIFIED, NOT INTRACTABLE, W/OUT STATUS EPILEPTICUS: ICD-10-CM

## 2023-01-01 PROCEDURE — 99349 HOME/RES VST EST MOD MDM 40: CPT

## 2023-01-01 RX ORDER — CIPROFLOXACIN HYDROCHLORIDE 250 MG/1
250 TABLET, FILM COATED ORAL
Qty: 14 | Refills: 0 | Status: COMPLETED | COMMUNITY
Start: 2023-01-01 | End: 2023-01-01

## 2023-01-01 RX ORDER — ATORVASTATIN CALCIUM 80 MG/1
80 TABLET, FILM COATED ORAL
Qty: 90 | Refills: 3 | Status: ACTIVE | COMMUNITY
Start: 2020-02-21 | End: 1900-01-01

## 2023-01-01 RX ORDER — LEVETIRACETAM 100 MG/ML
100 SOLUTION ORAL
Qty: 1410 | Refills: 3 | Status: ACTIVE | COMMUNITY
Start: 2020-03-23 | End: 1900-01-01

## 2023-01-01 RX ORDER — PAROXETINE HYDROCHLORIDE 40 MG/1
40 TABLET, FILM COATED ORAL
Qty: 90 | Refills: 3 | Status: ACTIVE | COMMUNITY
Start: 2020-02-21 | End: 1900-01-01

## 2023-01-01 RX ORDER — METOPROLOL TARTRATE 50 MG/1
50 TABLET, FILM COATED ORAL
Qty: 180 | Refills: 3 | Status: ACTIVE | COMMUNITY
Start: 2020-02-21 | End: 1900-01-01

## 2023-01-01 RX ORDER — SULFAMETHOXAZOLE AND TRIMETHOPRIM 800; 160 MG/1; MG/1
800-160 TABLET ORAL
Qty: 6 | Refills: 0 | Status: COMPLETED | COMMUNITY
Start: 2023-01-01 | End: 2023-01-01

## 2023-01-01 RX ORDER — BENZONATATE 100 MG/1
100 CAPSULE ORAL
Qty: 30 | Refills: 1 | Status: COMPLETED | COMMUNITY
Start: 2022-01-01 | End: 2023-01-01

## 2023-03-22 PROBLEM — R53.2 FUNCTIONAL QUADRIPLEGIA: Status: ACTIVE | Noted: 2022-04-13

## 2023-03-22 PROBLEM — L89.154 DECUBITUS ULCER OF SACRAL REGION, STAGE 4: Status: RESOLVED | Noted: 2020-02-21 | Resolved: 2021-05-05

## 2023-03-22 PROBLEM — R39.9 URINARY TRACT INFECTION SYMPTOMS: Status: ACTIVE | Noted: 2021-03-22

## 2023-03-27 NOTE — HISTORY OF PRESENT ILLNESS
[Formal Caregiver] : formal caregiver [FreeTextEntry1] : encephalopathy [FreeTextEntry2] : 83 F h/o CAD with MI s/p CABG x5 in 1997 complicated by anoxic brain injury, dementia,  hx stage 4 decub, hx DVTs, IVC filter, PUD, GI bleed,  hx breast CA, multiple fx, TIA\par \par anoxic encephalopathy. functional quadriplegia. spoon fed purees, thickened fluids. Split shift care.\par seizure d/o- last sz > 1 year ago. on Keppra\par Urinary retention. Aide caths ~BID in addition to spontaneous (incomplete) voids. C/o odorous urine. Unclear if there is abd pain or dysuria\par no skin issues\par CAD- on aspirin, atorvastatin, zetia, metoprolol.

## 2023-03-27 NOTE — PHYSICAL EXAM
[Normal Sclera/Conjunctiva] : normal sclera/conjunctiva [Normal Outer Ear/Nose] : the ears and nose were normal in appearance [No JVD] : no jugular venous distention [No Respiratory Distress] : no respiratory distress [Normal Rate] : heart rate was normal  [Regular Rhythm] : with a regular rhythm [Normal S1, S2] : normal S1 and S2 [Normal Bowel Sounds] : normal bowel sounds [Soft] : abdomen soft [No Joint Swelling] : no joint swelling seen [No Rash] : no rash [No Skin Lesions] : no skin lesions [Normal Affect] : the affect was normal [de-identified] : thin, contracted, won't hold up head  [de-identified] : possible abd tenderness. [FreeTextEntry1] : large external hemmorrhoids [de-identified] : contorted posture with multiple contractures, including neck contracture. Unable to extend neck [de-identified] : sacral scarring from prior wounds

## 2023-06-13 PROBLEM — G40.909 SEIZURE DISORDER: Status: ACTIVE | Noted: 2020-02-21

## 2023-06-13 PROBLEM — Z87.19 HISTORY OF UPPER GASTROINTESTINAL HEMORRHAGE: Status: RESOLVED | Noted: 2020-02-25 | Resolved: 2023-01-01

## 2023-06-13 PROBLEM — I25.10 CAD (CORONARY ATHEROSCLEROTIC DISEASE): Status: ACTIVE | Noted: 2020-02-21

## 2023-06-13 PROBLEM — U07.1 COVID-19 VIRUS INFECTION: Status: RESOLVED | Noted: 2022-01-01 | Resolved: 2023-01-01

## 2023-06-13 PROBLEM — G93.1 ANOXIC ENCEPHALOPATHY: Status: ACTIVE | Noted: 2020-02-21

## 2023-06-13 NOTE — HISTORY OF PRESENT ILLNESS
[Formal Caregiver] : formal caregiver [FreeTextEntry1] : encephalopathy [FreeTextEntry2] : 83 F h/o CAD with MI s/p CABG x5 in 1997 complicated by anoxic brain injury, dementia,  hx stage 4 decub, hx DVTs, IVC filter, PUD, GI bleed,  hx breast CA, multiple fx, TIA\par \par no acute issues\par anoxic encephalopathy. functional quadriplegia. spoon fed purees, thickened fluids. Split shift care.\par seizure d/o- no recent sz. on Keppra\par Urinary retention. Aide caths ~BID in addition to spontaneous (incomplete) voids.\par no skin issues\par CAD- on aspirin, atorvastatin, zetia, metoprolol.\par scratch on left 5th finger. aide says she rubs her hand on the bed

## 2023-06-13 NOTE — CHRONIC CARE ASSESSMENT
[PPS Score: ____] : Palliative Performance Scale (PPS) Score: [unfilled] [de-identified] : none [de-identified] : pureed food

## 2023-06-13 NOTE — PHYSICAL EXAM
[Normal Sclera/Conjunctiva] : normal sclera/conjunctiva [Normal Outer Ear/Nose] : the ears and nose were normal in appearance [No JVD] : no jugular venous distention [No Respiratory Distress] : no respiratory distress [Normal Rate] : heart rate was normal  [Regular Rhythm] : with a regular rhythm [Normal S1, S2] : normal S1 and S2 [Normal Bowel Sounds] : normal bowel sounds [Soft] : abdomen soft [No Joint Swelling] : no joint swelling seen [No Rash] : no rash [Normal Affect] : the affect was normal [de-identified] : thin, contracted, won't hold up head  [de-identified] : possible abd tenderness. [de-identified] : contorted posture with multiple contractures, including neck contracture. Unable to extend neck [de-identified] : abrasion left 5th finger

## 2024-09-23 NOTE — H&P ADULT - PROBLEM SELECTOR PLAN 4
[FreeTextEntry1] : This is a 74 year year old female here today for follow up cardiac followup evaluation. \par  She has a past medical history significant for Mohs surgery, s/p cataract surgery.\par  \par  Today she is feeling generally well and does not have any complaints at this time. She remains on Telmisartan HCTZ 80/12.5mg PO DAILY. \par  \par  PMH:\par  She was supposed to be on Amlodipine 5mg PO DAILY for BP management in addition to her Telmisartan HCTZ from when she had her 24 hour BP cuff done on 8/19/21 which demonstrated stage 1 HTN. She states that she did not like the side effects of the Amlodipine 5mg PO DAILY and stopped it herself. She states if her BP is elevated today then she is willing to rechallenge herself with the increased dose of Telmisartan (she did not like the side effects of the 80mg dose in the past).\par  \par  -The patient has no history of rheumatic fever.  Her cardiac risk factors include a remote history of smoking (he stopped over 35 years ago, and apparently now hypertension.\par  \par  BLOOD PRESSURE:\par  -BP is controlled on today's exam. \par  \par  -I have discussed the importance of maintaining good BP control and reviewed the newest guidelines with the patient while re-enforcing dietary sodium restrictions to no more than 2-3 g daily, DASH diet, life style modifications as well as the goal of maintaining ideal body weight with the patient today. I have advised the patient to avoid the use of over-the-counter medications/ supplements especially NSAIDS.\par  \par  I have reviewed with Ms. PELAYO that serious health consequences can occur when blood pressure is not well controlled and the need for strict compliance with medication and that optimal control can significantly reduce the risk of cardiovascular disease stroke, heart attack and other organ damage. They verbally expressed understanding of the fore mentioned serious health consequences to me today.\par  \par  BLOOD WORK:\par  -Blood work script given to the patient to have blood work done prior to her next follow up appointment. \par  \par  CHOLESTEROL CONTROL:\par  -Patient will continue the advised  TLC diet and to continue follow-up for treatment of hyperlipidemia and repeat blood testing with diet and exercise. I have discussed different exercises and the importance of maintenance of optimal body weight. The importance of staying within guidelines and recommendations was stressed to the patient today and they acknowledged that they understand this to me verbally.\par  \par   -Ms. PELAYO was educated and advised that failure to follow-up with my medical recommendations to lower cholesterol can result in severe health consequences therefore, they will continuing a low saturated and low fat diet and to avoid excessive carbohydrates to help reduce triglycerides and that lowering LDL levels is associated with a significant decrease in serious cardiac events including but not limited to heart attack stroke and overall death. We will continue lipid lowering agents as advised based on blood test results and the patient understands to call if they develop severe muscle discomfort or if they have a reddish tinted discoloration in their urine.\par  \par  TESTING/REPORTS:\par  -EKG done 12/05/2022 which demonstrated regular sinus rhythm with nonspecific ST-T wave changes BPM of 69.\par  \par  -Echocardiogram done April 11, 2022 demonstrated mild mitral and tricuspid regurgitation with mildly thickened and calcified aortic valve with normal left ventricular systolic function ejection fraction 65%.\par  \par  -EKG done  04/11/2022 which demonstrated regular sinus rhythm  BPM of 75 otherwise remarkable for left atrial abnormality and a Q wave in V1 and V2 with poor R wave progression.\par  \par  -EKG done Aug 18, 2021 which demonstrated regular sinus rhythm, BPM 66 of otherwise remarkable for left atrial abnormality and a Q wave in V1 and V2 with poor R wave progression.\par  \par  -24 hour BP cuff done on 8/19/21 which demonstrated stage 1 HTN.\par  \par  -Electrocardiogram done September 23, 2020 demonstrated normal sinus rhythm rate 70 bpm is otherwise remarkable for left atrial abnormality and a Q wave in V1 and V2 with poor R wave progression.\par  \par  -Nuclear stress test done on 10/27/2020 demonstrated no evidence of infarction or inducible ischemia .\par  \par  PLAN:\par  -She will continue with her current medications and will contact the office if she is having any complaints between now and their next follow up appointment.\par  -Blood work script given to the patient to have blood work done prior to her next follow up appointment. She understands she needs to get blood work done since her last BW was done in 2020.\par  -She will follow up in 3 months.\par   \par  I have discussed the plan of care with Ms. MIGUELITO PELAYO. She is compliant with all of her medications.\par  \par  The patient understands that aerobic exercises must be increased to minutes 4 times/week and a detailed discussion of lifestyle modification was done today. \par  The patient has a good understanding of the diagnosis, treatment plan and lifestyle modification. \par  She will contact me at the office for any questions with their care or any changes in their health status.\par  \par  The patient was seen together with supervision physician Dr. Sujit Be and the plan of care was discussed with the patient and will be carried out as noted above.\par  \par  Faith PARNELL 
1.5 cm Baker's cyst in the popliteal fossa.  -monitor for increased knee swelling or signs of pain   -pain meds as indicated